# Patient Record
Sex: MALE | Race: WHITE | Employment: OTHER | URBAN - METROPOLITAN AREA
[De-identification: names, ages, dates, MRNs, and addresses within clinical notes are randomized per-mention and may not be internally consistent; named-entity substitution may affect disease eponyms.]

---

## 2017-01-04 ENCOUNTER — TELEPHONE (OUTPATIENT)
Dept: FAMILY MEDICINE CLINIC | Age: 62
End: 2017-01-04

## 2017-01-11 ENCOUNTER — OFFICE VISIT (OUTPATIENT)
Dept: FAMILY MEDICINE CLINIC | Age: 62
End: 2017-01-11

## 2017-01-11 VITALS
SYSTOLIC BLOOD PRESSURE: 134 MMHG | BODY MASS INDEX: 37.49 KG/M2 | DIASTOLIC BLOOD PRESSURE: 76 MMHG | WEIGHT: 282.9 LBS | HEART RATE: 84 BPM | HEIGHT: 73 IN | RESPIRATION RATE: 16 BRPM

## 2017-01-11 DIAGNOSIS — Z23 NEED FOR PNEUMOCOCCAL VACCINATION: ICD-10-CM

## 2017-01-11 DIAGNOSIS — E66.9 OBESITY (BMI 30-39.9): ICD-10-CM

## 2017-01-11 DIAGNOSIS — Z23 NEED FOR TDAP VACCINATION: ICD-10-CM

## 2017-01-11 DIAGNOSIS — Z11.4 SCREENING FOR HIV (HUMAN IMMUNODEFICIENCY VIRUS): ICD-10-CM

## 2017-01-11 DIAGNOSIS — E78.2 MIXED HYPERLIPIDEMIA: ICD-10-CM

## 2017-01-11 DIAGNOSIS — Z23 NEED FOR INFLUENZA VACCINATION: ICD-10-CM

## 2017-01-11 DIAGNOSIS — E11.9 CONTROLLED TYPE 2 DIABETES MELLITUS WITHOUT COMPLICATION, WITHOUT LONG-TERM CURRENT USE OF INSULIN (HCC): Primary | ICD-10-CM

## 2017-01-11 DIAGNOSIS — Z11.59 NEED FOR HEPATITIS C SCREENING TEST: ICD-10-CM

## 2017-01-11 DIAGNOSIS — I10 ESSENTIAL HYPERTENSION: ICD-10-CM

## 2017-01-11 DIAGNOSIS — Z86.73 HISTORY OF CVA (CEREBROVASCULAR ACCIDENT): ICD-10-CM

## 2017-01-11 PROCEDURE — 90471 IMMUNIZATION ADMIN: CPT | Performed by: NURSE PRACTITIONER

## 2017-01-11 PROCEDURE — 90715 TDAP VACCINE 7 YRS/> IM: CPT | Performed by: NURSE PRACTITIONER

## 2017-01-11 PROCEDURE — 90732 PPSV23 VACC 2 YRS+ SUBQ/IM: CPT | Performed by: NURSE PRACTITIONER

## 2017-01-11 PROCEDURE — 90688 IIV4 VACCINE SPLT 0.5 ML IM: CPT | Performed by: NURSE PRACTITIONER

## 2017-01-11 PROCEDURE — 99214 OFFICE O/P EST MOD 30 MIN: CPT | Performed by: NURSE PRACTITIONER

## 2017-01-11 PROCEDURE — 90472 IMMUNIZATION ADMIN EACH ADD: CPT | Performed by: NURSE PRACTITIONER

## 2017-01-11 RX ORDER — LOSARTAN POTASSIUM 50 MG/1
50 TABLET ORAL DAILY
Qty: 30 TABLET | Refills: 11 | Status: SHIPPED | OUTPATIENT
Start: 2017-01-11 | End: 2018-01-19 | Stop reason: SDUPTHER

## 2017-01-11 ASSESSMENT — PATIENT HEALTH QUESTIONNAIRE - PHQ9
SUM OF ALL RESPONSES TO PHQ9 QUESTIONS 1 & 2: 0
2. FEELING DOWN, DEPRESSED OR HOPELESS: 0
1. LITTLE INTEREST OR PLEASURE IN DOING THINGS: 0
SUM OF ALL RESPONSES TO PHQ QUESTIONS 1-9: 0

## 2017-01-12 ASSESSMENT — ENCOUNTER SYMPTOMS
NAUSEA: 0
SHORTNESS OF BREATH: 0
COUGH: 0
ABDOMINAL PAIN: 0

## 2017-01-16 ENCOUNTER — TELEPHONE (OUTPATIENT)
Dept: FAMILY MEDICINE CLINIC | Age: 62
End: 2017-01-16

## 2017-01-16 DIAGNOSIS — E78.2 MIXED HYPERLIPIDEMIA: Primary | ICD-10-CM

## 2017-01-16 DIAGNOSIS — E11.9 CONTROLLED TYPE 2 DIABETES MELLITUS WITHOUT COMPLICATION, WITHOUT LONG-TERM CURRENT USE OF INSULIN (HCC): ICD-10-CM

## 2017-01-16 RX ORDER — ATORVASTATIN CALCIUM 40 MG/1
40 TABLET, FILM COATED ORAL DAILY
Qty: 30 TABLET | Refills: 5 | Status: SHIPPED | OUTPATIENT
Start: 2017-01-16 | End: 2018-01-19 | Stop reason: SDUPTHER

## 2017-01-16 RX ORDER — PIOGLITAZONEHYDROCHLORIDE 45 MG/1
45 TABLET ORAL DAILY
Qty: 30 TABLET | Refills: 5 | Status: SHIPPED | OUTPATIENT
Start: 2017-01-16 | End: 2017-10-17

## 2017-07-05 ENCOUNTER — TELEPHONE (OUTPATIENT)
Dept: FAMILY MEDICINE CLINIC | Age: 62
End: 2017-07-05

## 2017-07-17 ENCOUNTER — OFFICE VISIT (OUTPATIENT)
Dept: FAMILY MEDICINE CLINIC | Age: 62
End: 2017-07-17
Payer: MEDICAID

## 2017-07-17 VITALS
RESPIRATION RATE: 14 BRPM | HEART RATE: 71 BPM | HEIGHT: 73 IN | SYSTOLIC BLOOD PRESSURE: 118 MMHG | OXYGEN SATURATION: 98 % | DIASTOLIC BLOOD PRESSURE: 76 MMHG | BODY MASS INDEX: 38.4 KG/M2 | WEIGHT: 289.7 LBS

## 2017-07-17 DIAGNOSIS — M77.12 LEFT TENNIS ELBOW: ICD-10-CM

## 2017-07-17 DIAGNOSIS — E78.2 MIXED HYPERLIPIDEMIA: ICD-10-CM

## 2017-07-17 DIAGNOSIS — I10 ESSENTIAL HYPERTENSION: ICD-10-CM

## 2017-07-17 DIAGNOSIS — E66.9 OBESITY (BMI 30-39.9): ICD-10-CM

## 2017-07-17 DIAGNOSIS — Z86.73 HISTORY OF CVA (CEREBROVASCULAR ACCIDENT): ICD-10-CM

## 2017-07-17 DIAGNOSIS — E11.9 CONTROLLED TYPE 2 DIABETES MELLITUS WITHOUT COMPLICATION, WITHOUT LONG-TERM CURRENT USE OF INSULIN (HCC): Primary | ICD-10-CM

## 2017-07-17 PROCEDURE — 99214 OFFICE O/P EST MOD 30 MIN: CPT | Performed by: NURSE PRACTITIONER

## 2017-07-17 ASSESSMENT — ENCOUNTER SYMPTOMS
SHORTNESS OF BREATH: 0
ABDOMINAL PAIN: 0
COUGH: 0
NAUSEA: 0

## 2017-09-24 DIAGNOSIS — E11.9 CONTROLLED TYPE 2 DIABETES MELLITUS WITHOUT COMPLICATION, WITHOUT LONG-TERM CURRENT USE OF INSULIN (HCC): ICD-10-CM

## 2017-10-10 ENCOUNTER — TELEPHONE (OUTPATIENT)
Dept: FAMILY MEDICINE CLINIC | Age: 62
End: 2017-10-10

## 2017-10-16 ENCOUNTER — HOSPITAL ENCOUNTER (OUTPATIENT)
Age: 62
Discharge: HOME | End: 2017-10-16
Payer: MEDICAID

## 2017-10-16 DIAGNOSIS — E11.9 CONTROLLED TYPE 2 DIABETES MELLITUS WITHOUT COMPLICATION, WITHOUT LONG-TERM CURRENT USE OF INSULIN (HCC): ICD-10-CM

## 2017-10-16 LAB
AVERAGE GLUCOSE: 192 MG/DL (ref 70–126)
CREATININE, URINE: 65.8 MG/DL
HBA1C MFR BLD: 8.4 % (ref 4.4–6.4)
MICROALBUMIN UR-MCNC: < 1.2 MG/DL
MICROALBUMIN/CREAT UR-RTO: 18 MG/G (ref 0–30)

## 2017-10-16 PROCEDURE — 36415 COLL VENOUS BLD VENIPUNCTURE: CPT

## 2017-10-16 PROCEDURE — 83036 HEMOGLOBIN GLYCOSYLATED A1C: CPT

## 2017-10-16 PROCEDURE — 82043 UR ALBUMIN QUANTITATIVE: CPT

## 2017-10-17 ENCOUNTER — OFFICE VISIT (OUTPATIENT)
Dept: FAMILY MEDICINE CLINIC | Age: 62
End: 2017-10-17
Payer: MEDICAID

## 2017-10-17 VITALS
OXYGEN SATURATION: 97 % | HEART RATE: 82 BPM | RESPIRATION RATE: 14 BRPM | BODY MASS INDEX: 36.98 KG/M2 | WEIGHT: 279 LBS | HEIGHT: 73 IN | SYSTOLIC BLOOD PRESSURE: 126 MMHG | DIASTOLIC BLOOD PRESSURE: 66 MMHG

## 2017-10-17 DIAGNOSIS — E66.9 OBESITY (BMI 30-39.9): ICD-10-CM

## 2017-10-17 DIAGNOSIS — E78.2 MIXED HYPERLIPIDEMIA: ICD-10-CM

## 2017-10-17 DIAGNOSIS — Z12.5 SCREENING PSA (PROSTATE SPECIFIC ANTIGEN): ICD-10-CM

## 2017-10-17 DIAGNOSIS — Z86.73 HISTORY OF CVA (CEREBROVASCULAR ACCIDENT): ICD-10-CM

## 2017-10-17 DIAGNOSIS — I10 ESSENTIAL HYPERTENSION: ICD-10-CM

## 2017-10-17 DIAGNOSIS — E11.9 CONTROLLED TYPE 2 DIABETES MELLITUS WITHOUT COMPLICATION, WITHOUT LONG-TERM CURRENT USE OF INSULIN (HCC): Primary | ICD-10-CM

## 2017-10-17 PROCEDURE — 99214 OFFICE O/P EST MOD 30 MIN: CPT | Performed by: NURSE PRACTITIONER

## 2017-10-17 RX ORDER — PIOGLITAZONEHYDROCHLORIDE 45 MG/1
45 TABLET ORAL DAILY
Qty: 30 TABLET | Refills: 3 | Status: SHIPPED | OUTPATIENT
Start: 2017-10-17 | End: 2018-01-19 | Stop reason: SDUPTHER

## 2017-10-17 ASSESSMENT — ENCOUNTER SYMPTOMS
NAUSEA: 0
COUGH: 0
SHORTNESS OF BREATH: 0
ABDOMINAL PAIN: 0

## 2017-10-17 NOTE — PROGRESS NOTES
Visit Information       Chronic Disease Visit Information    BP Readings from Last 3 Encounters:   10/17/17 126/66   07/17/17 118/76   01/11/17 134/76          Hemoglobin A1C (%)   Date Value   10/16/2017 8.4 (H)   07/13/2017 8.3 (H)   01/14/2017 8.9 (H)     MICROALBUMIN, RANDOM URINE (mg/dL)   Date Value   10/16/2017 < 1.20     LDL Calculated (mg/dL)   Date Value   07/13/2017 158     HDL (mg/dL)   Date Value   07/13/2017 44     BUN (mg/dl)   Date Value   01/14/2017 13     CREATININE (mg/dl)   Date Value   01/14/2017 0.9     Glucose   Date Value   01/14/2017 179 mg/dl (H)   07/02/2015 197 mg/dL (H)            Have you changed or started any medications since your last visit including any over-the-counter medicines, vitamins, or herbal medicines? no   Are you having any side effects from any of your medications? -  no  Have you stopped taking any of your medications? Is so, why? -  no    Have you seen any other physician or provider since your last visit? No  Have you had any other diagnostic tests since your last visit? Yes - Records Obtained  Have you been seen in the emergency room and/or had an admission to a hospital since we last saw you? No  Have you had your annual diabetic retinal (eye) exam? No  Have you had your routine dental cleaning in the past 6 months? no    Have you activated your [employer] account? If not, what are your barriers?  No: refused     Patient Care Team:  Aissatou Bess NP as PCP - General (Certified Nurse Practitioner)         Medical History Review  Past Medical, Family, and Social History reviewed and does contribute to the patient presenting condition    Health Maintenance   Topic Date Due    Diabetic retinal exam  04/26/1966    HIV screen  04/26/1971    Zostavax vaccine  04/26/2016    Flu vaccine (1) 09/01/2017    Lipid screen  07/13/2018    Diabetic foot exam  07/17/2018    Diabetic hemoglobin A1C test  10/16/2018    Diabetic microalbuminuria test  10/16/2018    Colon cancer screen colonoscopy  01/09/2027    DTaP/Tdap/Td vaccine (2 - Td) 01/11/2027    Pneumococcal med risk  Completed    Hepatitis C screen  Completed         Health Maintenance   Topic Date Due    Diabetic retinal exam  04/26/1966    HIV screen  04/26/1971    Zostavax vaccine  04/26/2016    Flu vaccine (1) 09/01/2017    Lipid screen  07/13/2018    Diabetic foot exam  07/17/2018    Diabetic hemoglobin A1C test  10/16/2018    Diabetic microalbuminuria test  10/16/2018    Colon cancer screen colonoscopy  01/09/2027    DTaP/Tdap/Td vaccine (2 - Td) 01/11/2027    Pneumococcal med risk  Completed    Hepatitis C screen  Completed

## 2017-12-21 ENCOUNTER — TELEPHONE (OUTPATIENT)
Dept: FAMILY MEDICINE CLINIC | Age: 62
End: 2017-12-21

## 2017-12-21 NOTE — TELEPHONE ENCOUNTER
Patient is calling in because off/on for a couple of months now he has had episodes where he wakes up from sleeping, and his left arm is colder than the right and he has tingling in some of his fingers. He said it happened yesterday and today, but off/on for a couple of months. He wanted to know what Manpreet Case would recommend he do. He declined to schedule an appointment unless Manpreet Case wanted to see him. Please advise.

## 2017-12-26 ENCOUNTER — OFFICE VISIT (OUTPATIENT)
Dept: FAMILY MEDICINE CLINIC | Age: 62
End: 2017-12-26
Payer: MEDICAID

## 2017-12-26 VITALS
HEART RATE: 76 BPM | HEIGHT: 73 IN | BODY MASS INDEX: 37.34 KG/M2 | SYSTOLIC BLOOD PRESSURE: 158 MMHG | DIASTOLIC BLOOD PRESSURE: 88 MMHG | WEIGHT: 281.7 LBS | RESPIRATION RATE: 16 BRPM

## 2017-12-26 DIAGNOSIS — E11.9 CONTROLLED TYPE 2 DIABETES MELLITUS WITHOUT COMPLICATION, WITHOUT LONG-TERM CURRENT USE OF INSULIN (HCC): ICD-10-CM

## 2017-12-26 DIAGNOSIS — I10 ESSENTIAL HYPERTENSION: ICD-10-CM

## 2017-12-26 DIAGNOSIS — L92.9 GRANULOMA, SKIN: ICD-10-CM

## 2017-12-26 DIAGNOSIS — R20.9 COLD HAND WITHOUT PERIPHERAL VASCULAR DISEASE: Primary | ICD-10-CM

## 2017-12-26 DIAGNOSIS — Z86.73 HISTORY OF CVA (CEREBROVASCULAR ACCIDENT): ICD-10-CM

## 2017-12-26 DIAGNOSIS — E78.2 MIXED HYPERLIPIDEMIA: ICD-10-CM

## 2017-12-26 PROCEDURE — G8417 CALC BMI ABV UP PARAM F/U: HCPCS | Performed by: NURSE PRACTITIONER

## 2017-12-26 PROCEDURE — G8484 FLU IMMUNIZE NO ADMIN: HCPCS | Performed by: NURSE PRACTITIONER

## 2017-12-26 PROCEDURE — 3045F PR MOST RECENT HEMOGLOBIN A1C LEVEL 7.0-9.0%: CPT | Performed by: NURSE PRACTITIONER

## 2017-12-26 PROCEDURE — 3017F COLORECTAL CA SCREEN DOC REV: CPT | Performed by: NURSE PRACTITIONER

## 2017-12-26 PROCEDURE — 1036F TOBACCO NON-USER: CPT | Performed by: NURSE PRACTITIONER

## 2017-12-26 PROCEDURE — G8427 DOCREV CUR MEDS BY ELIG CLIN: HCPCS | Performed by: NURSE PRACTITIONER

## 2017-12-26 PROCEDURE — 99213 OFFICE O/P EST LOW 20 MIN: CPT | Performed by: NURSE PRACTITIONER

## 2017-12-26 ASSESSMENT — ENCOUNTER SYMPTOMS
NAUSEA: 0
ABDOMINAL PAIN: 0
SHORTNESS OF BREATH: 0
COUGH: 0

## 2017-12-26 NOTE — PROGRESS NOTES
Visit Information    Have you changed or started any medications since your last visit including any over-the-counter medicines, vitamins, or herbal medicines? no   Are you having any side effects from any of your medications? -  no  Have you stopped taking any of your medications? Is so, why? -  no    Have you seen any other physician or provider since your last visit? No  Have you had any other diagnostic tests since your last visit? No  Have you been seen in the emergency room and/or had an admission to a hospital since we last saw you? No  Have you had your routine dental cleaning in the past 6 months? no    Have you activated your [employer] account? If not, what are your barriers? No: pt choice     Patient Care Team:  Lorette Homans, NP as PCP - General (Certified Nurse Practitioner)    Medical History Review  Past Medical, Family, and Social History reviewed and does not contribute to the patient presenting condition    Health Maintenance   Topic Date Due    Diabetic retinal exam  04/26/1966    HIV screen  04/26/1971    Zostavax vaccine  04/26/2016    Flu vaccine (1) 09/01/2017    Lipid screen  07/13/2018    Diabetic foot exam  07/17/2018    Diabetic hemoglobin A1C test  10/16/2018    Diabetic microalbuminuria test  10/16/2018    Colon cancer screen colonoscopy  01/09/2027    DTaP/Tdap/Td vaccine (2 - Td) 01/11/2027    Pneumococcal med risk  Completed    Hepatitis C screen  Completed       US doppler arterial left arm scheduled at [facility] Wednesday 12/27/17 at 10am. Pt is to arrive by 9:30am to the 2nd floor [address].

## 2017-12-26 NOTE — PROGRESS NOTES
Subjective:      Patient ID: Jai Ramirez is a 65 y.o. male. HPI: Acute for cold hand    Chief Complaint   Patient presents with    Tingling     left pinky and ring finger    Cold Extremity     left arm    Lesion(s)     left middle finger       Onset of 2-3 months of off and on left cold extremity, tingling and numbness in 4th and 5th digit when he wakes up in morning. Denies daily event. Denies arm weakness or pain in arm with activity. Symptoms resolve through the day. Right side sleeper. Hx of CVA. No smoking hx. Lesion on left middle finger. Onset of 1 week. No pain. Hard. No redness. Denies trauma. FROM of finger. Patient Active Problem List   Diagnosis    Controlled type 2 diabetes mellitus without complication, without long-term current use of insulin ([facility] [address]    Essential hypertension    History of CVA (cerebrovascular accident)    Mixed hyperlipidemia    Obesity (BMI 30-39. 9)       BP elevated. Did not take his BP medications. BP Readings from Last 3 Encounters:   12/26/17 (!) 158/88   10/17/17 126/66   07/17/17 118/76         Review of Systems   Constitutional: Negative for chills and fever. HENT: Negative. Respiratory: Negative for cough and shortness of breath. Cardiovascular: Negative for chest pain. Gastrointestinal: Negative for abdominal pain and nausea. Skin: Negative for rash. Neurological: Positive for numbness (tingling). Negative for dizziness, weakness, light-headedness and headaches. Psychiatric/Behavioral: Negative. Objective:   Physical Exam   Constitutional: He is oriented to person, place, and time. Vital signs are normal. He appears well-developed and well-nourished. He is active. He does not have a sickly appearance. No distress.    HENT:   Right Ear: Tympanic membrane normal.   Left Ear: Tympanic membrane normal.   Nose: Nose normal.   Mouth/Throat: Oropharynx is clear and moist and mucous membranes are normal.

## 2017-12-29 ENCOUNTER — HOSPITAL ENCOUNTER (OUTPATIENT)
Dept: INTERVENTIONAL RADIOLOGY/VASCULAR | Age: 62
Discharge: HOME | End: 2017-12-29
Payer: MEDICAID

## 2017-12-29 DIAGNOSIS — R20.9 COLD HAND WITHOUT PERIPHERAL VASCULAR DISEASE: ICD-10-CM

## 2017-12-29 PROCEDURE — 93931 UPPER EXTREMITY STUDY: CPT

## 2018-01-09 ENCOUNTER — TELEPHONE (OUTPATIENT)
Dept: FAMILY MEDICINE CLINIC | Age: 63
End: 2018-01-09

## 2018-01-09 NOTE — TELEPHONE ENCOUNTER
L/M for pt to call office. Called patient for pre visit planning. Pt has appt on 1/17/18 at 3:30pm.  Please remind pt of appt date and time and check to see if they had their lab work done.

## 2018-01-18 ENCOUNTER — HOSPITAL ENCOUNTER (OUTPATIENT)
Age: 63
Discharge: HOME | End: 2018-01-18
Payer: MEDICAID

## 2018-01-18 DIAGNOSIS — Z12.5 SCREENING PSA (PROSTATE SPECIFIC ANTIGEN): ICD-10-CM

## 2018-01-18 DIAGNOSIS — E11.9 CONTROLLED TYPE 2 DIABETES MELLITUS WITHOUT COMPLICATION, WITHOUT LONG-TERM CURRENT USE OF INSULIN (HCC): ICD-10-CM

## 2018-01-18 DIAGNOSIS — E78.2 MIXED HYPERLIPIDEMIA: ICD-10-CM

## 2018-01-18 LAB
ALBUMIN SERPL-MCNC: 4.1 G/DL (ref 3.5–5.1)
ALP BLD-CCNC: 92 U/L (ref 38–126)
ALT SERPL-CCNC: 16 U/L (ref 11–66)
ANION GAP SERPL CALCULATED.3IONS-SCNC: 12 MEQ/L (ref 8–16)
AST SERPL-CCNC: 17 U/L (ref 5–40)
AVERAGE GLUCOSE: 174 MG/DL (ref 70–126)
BILIRUB SERPL-MCNC: 0.4 MG/DL (ref 0.3–1.2)
BUN BLDV-MCNC: 18 MG/DL (ref 7–22)
CALCIUM SERPL-MCNC: 9.3 MG/DL (ref 8.5–10.5)
CHLORIDE BLD-SCNC: 99 MEQ/L (ref 98–111)
CHOLESTEROL, TOTAL: 132 MG/DL (ref 100–199)
CO2: 30 MEQ/L (ref 23–33)
CREAT SERPL-MCNC: 1 MG/DL (ref 0.4–1.2)
GFR SERPL CREATININE-BSD FRML MDRD: 76 ML/MIN/1.73M2
GLUCOSE BLD-MCNC: 133 MG/DL (ref 70–108)
HBA1C MFR BLD: 7.8 % (ref 4.4–6.4)
HDLC SERPL-MCNC: 57 MG/DL
LDL CHOLESTEROL CALCULATED: 62 MG/DL
POTASSIUM SERPL-SCNC: 4.5 MEQ/L (ref 3.5–5.2)
PROSTATE SPECIFIC ANTIGEN: 0.64 NG/ML (ref 0–1)
SODIUM BLD-SCNC: 141 MEQ/L (ref 135–145)
TOTAL PROTEIN: 7.2 G/DL (ref 6.1–8)
TRIGL SERPL-MCNC: 65 MG/DL (ref 0–199)

## 2018-01-18 PROCEDURE — 36415 COLL VENOUS BLD VENIPUNCTURE: CPT

## 2018-01-18 PROCEDURE — G0103 PSA SCREENING: HCPCS

## 2018-01-18 PROCEDURE — 83036 HEMOGLOBIN GLYCOSYLATED A1C: CPT

## 2018-01-18 PROCEDURE — 80053 COMPREHEN METABOLIC PANEL: CPT

## 2018-01-18 PROCEDURE — 80061 LIPID PANEL: CPT

## 2018-01-19 ENCOUNTER — TELEPHONE (OUTPATIENT)
Dept: FAMILY MEDICINE CLINIC | Age: 63
End: 2018-01-19

## 2018-01-19 ENCOUNTER — OFFICE VISIT (OUTPATIENT)
Dept: FAMILY MEDICINE CLINIC | Age: 63
End: 2018-01-19
Payer: MEDICAID

## 2018-01-19 VITALS
DIASTOLIC BLOOD PRESSURE: 74 MMHG | WEIGHT: 285.5 LBS | HEIGHT: 73 IN | SYSTOLIC BLOOD PRESSURE: 136 MMHG | OXYGEN SATURATION: 99 % | HEART RATE: 78 BPM | BODY MASS INDEX: 37.84 KG/M2 | RESPIRATION RATE: 14 BRPM

## 2018-01-19 DIAGNOSIS — L92.9 GRANULOMA, SKIN: Primary | ICD-10-CM

## 2018-01-19 DIAGNOSIS — I10 ESSENTIAL HYPERTENSION: ICD-10-CM

## 2018-01-19 DIAGNOSIS — E11.9 CONTROLLED TYPE 2 DIABETES MELLITUS WITHOUT COMPLICATION, WITHOUT LONG-TERM CURRENT USE OF INSULIN (HCC): ICD-10-CM

## 2018-01-19 DIAGNOSIS — E78.2 MIXED HYPERLIPIDEMIA: ICD-10-CM

## 2018-01-19 PROCEDURE — G8427 DOCREV CUR MEDS BY ELIG CLIN: HCPCS | Performed by: NURSE PRACTITIONER

## 2018-01-19 PROCEDURE — G8484 FLU IMMUNIZE NO ADMIN: HCPCS | Performed by: NURSE PRACTITIONER

## 2018-01-19 PROCEDURE — 99214 OFFICE O/P EST MOD 30 MIN: CPT | Performed by: NURSE PRACTITIONER

## 2018-01-19 PROCEDURE — G8417 CALC BMI ABV UP PARAM F/U: HCPCS | Performed by: NURSE PRACTITIONER

## 2018-01-19 PROCEDURE — 3045F PR MOST RECENT HEMOGLOBIN A1C LEVEL 7.0-9.0%: CPT | Performed by: NURSE PRACTITIONER

## 2018-01-19 PROCEDURE — 3017F COLORECTAL CA SCREEN DOC REV: CPT | Performed by: NURSE PRACTITIONER

## 2018-01-19 PROCEDURE — 1036F TOBACCO NON-USER: CPT | Performed by: NURSE PRACTITIONER

## 2018-01-19 RX ORDER — ATORVASTATIN CALCIUM 40 MG/1
40 TABLET, FILM COATED ORAL DAILY
Qty: 30 TABLET | Refills: 11 | Status: SHIPPED | OUTPATIENT
Start: 2018-01-19 | End: 2019-04-24 | Stop reason: SDUPTHER

## 2018-01-19 RX ORDER — LOSARTAN POTASSIUM 50 MG/1
50 TABLET ORAL DAILY
Qty: 30 TABLET | Refills: 11 | Status: SHIPPED | OUTPATIENT
Start: 2018-01-19 | End: 2019-02-13 | Stop reason: SDUPTHER

## 2018-01-19 RX ORDER — PIOGLITAZONEHYDROCHLORIDE 45 MG/1
45 TABLET ORAL DAILY
Qty: 30 TABLET | Refills: 11 | Status: SHIPPED | OUTPATIENT
Start: 2018-01-19 | End: 2019-02-03 | Stop reason: SDUPTHER

## 2018-01-19 ASSESSMENT — ENCOUNTER SYMPTOMS
NAUSEA: 0
ABDOMINAL PAIN: 0
SHORTNESS OF BREATH: 0
COUGH: 0

## 2018-01-19 ASSESSMENT — PATIENT HEALTH QUESTIONNAIRE - PHQ9
SUM OF ALL RESPONSES TO PHQ9 QUESTIONS 1 & 2: 0
1. LITTLE INTEREST OR PLEASURE IN DOING THINGS: 0
2. FEELING DOWN, DEPRESSED OR HOPELESS: 0
SUM OF ALL RESPONSES TO PHQ QUESTIONS 1-9: 0

## 2018-01-19 NOTE — PROGRESS NOTES
Subjective:      Patient ID: Ruben Mcgowan is a 65 y.o. male. HPI  : 3 month Follow Up    Chief Complaint   Patient presents with    3 Month Follow-Up     lab results  left hand middle finger granuloma       Lesion on left middle finger. Onset of 4 week. No pain. Hard. No redness. Denies trauma. FROM of finger. Patient Active Problem List   Diagnosis    Controlled type 2 diabetes mellitus without complication, without long-term current use of insulin ([address]    Essential hypertension    History of CVA (cerebrovascular accident)    Mixed hyperlipidemia    Obesity (BMI 30-39. 9)       Overall doing well. Denies CP, SOB or chest tightness. No smoking hx. COLONOSCOPY - 2016    BP Readings from Last 3 Encounters:   01/19/18 136/74   12/26/17 (!) 158/88   10/17/17 126/66       BP wnl. Losartan 50 mg. Wt Readings from Last 3 Encounters:   01/19/18 285 lb 8 oz (129.5 kg)   12/26/17 281 lb 11.2 oz (127.8 kg)   10/17/17 279 lb (126.6 kg)       He is on Metformin 1000 mg, Jardiance 25 mg and Actos 45 mg Tolerating well. Diet poor. A1C shows improvement but not controlled. Lab Results   Component Value Date    LABA1C 7.8 (H) 01/18/2018    LABA1C 8.4 (H) 10/16/2017    LABA1C 8.3 (H) 07/13/2017     Lab Results   Component Value Date     07/02/2015       Lipitor 40 mg. Tolerating well.      No components found for: CHLPL  Lab Results   Component Value Date    TRIG 65 01/18/2018    TRIG 102 07/13/2017    TRIG 91 01/14/2017     Lab Results   Component Value Date    HDL 57 01/18/2018    HDL 44 07/13/2017    HDL 50 01/14/2017     Lab Results   Component Value Date    LDLCALC 62 01/18/2018    LDLCALC 158 07/13/2017    LDLCALC 150 01/14/2017     No results found for: LABVLDL      Chemistry        Component Value Date/Time     01/18/2018 1354    K 4.5 01/18/2018 1354    CL 99 01/18/2018 1354    CO2 30 01/18/2018 1354    BUN 18 01/18/2018 1354    CREATININE 1.0 01/18/2018 1354

## 2018-01-22 DIAGNOSIS — E11.9 CONTROLLED TYPE 2 DIABETES MELLITUS WITHOUT COMPLICATION, WITHOUT LONG-TERM CURRENT USE OF INSULIN (HCC): Primary | ICD-10-CM

## 2018-01-22 RX ORDER — ALOGLIPTIN 25 MG/1
25 TABLET, FILM COATED ORAL DAILY
Qty: 30 TABLET | Refills: 5 | Status: SHIPPED | OUTPATIENT
Start: 2018-01-22 | End: 2018-04-19 | Stop reason: SDUPTHER

## 2018-01-22 NOTE — TELEPHONE ENCOUNTER
Patient called back and gave phone # of Ortho in [place] with Jackeline Castillo. #[phone]. I called and got patient scheduled with Dr COHEN [facility] for 1/29 @2:30. Patient needs to arrive at 2:15pm. [address]. They do need referral faxed to [phone]. Please advice.

## 2018-01-23 DIAGNOSIS — E78.2 MIXED HYPERLIPIDEMIA: ICD-10-CM

## 2018-01-23 RX ORDER — ATORVASTATIN CALCIUM 40 MG/1
TABLET, FILM COATED ORAL
Qty: 30 TABLET | Refills: 11 | Status: SHIPPED | OUTPATIENT
Start: 2018-01-23 | End: 2018-04-19

## 2018-04-19 ENCOUNTER — OFFICE VISIT (OUTPATIENT)
Dept: FAMILY MEDICINE CLINIC | Age: 63
End: 2018-04-19
Payer: MEDICAID

## 2018-04-19 VITALS
HEIGHT: 73 IN | DIASTOLIC BLOOD PRESSURE: 60 MMHG | HEART RATE: 74 BPM | OXYGEN SATURATION: 97 % | SYSTOLIC BLOOD PRESSURE: 112 MMHG | RESPIRATION RATE: 14 BRPM | WEIGHT: 284 LBS | BODY MASS INDEX: 37.64 KG/M2

## 2018-04-19 DIAGNOSIS — E11.9 CONTROLLED TYPE 2 DIABETES MELLITUS WITHOUT COMPLICATION, WITHOUT LONG-TERM CURRENT USE OF INSULIN (HCC): Primary | ICD-10-CM

## 2018-04-19 DIAGNOSIS — I10 ESSENTIAL HYPERTENSION: ICD-10-CM

## 2018-04-19 DIAGNOSIS — E66.9 OBESITY (BMI 30-39.9): ICD-10-CM

## 2018-04-19 DIAGNOSIS — Z86.73 HISTORY OF CVA (CEREBROVASCULAR ACCIDENT): ICD-10-CM

## 2018-04-19 DIAGNOSIS — E78.2 MIXED HYPERLIPIDEMIA: ICD-10-CM

## 2018-04-19 LAB — HBA1C MFR BLD: 6.6 %

## 2018-04-19 PROCEDURE — G8427 DOCREV CUR MEDS BY ELIG CLIN: HCPCS | Performed by: NURSE PRACTITIONER

## 2018-04-19 PROCEDURE — 1036F TOBACCO NON-USER: CPT | Performed by: NURSE PRACTITIONER

## 2018-04-19 PROCEDURE — 3044F HG A1C LEVEL LT 7.0%: CPT | Performed by: NURSE PRACTITIONER

## 2018-04-19 PROCEDURE — 83036 HEMOGLOBIN GLYCOSYLATED A1C: CPT | Performed by: NURSE PRACTITIONER

## 2018-04-19 PROCEDURE — 3017F COLORECTAL CA SCREEN DOC REV: CPT | Performed by: NURSE PRACTITIONER

## 2018-04-19 PROCEDURE — 99214 OFFICE O/P EST MOD 30 MIN: CPT | Performed by: NURSE PRACTITIONER

## 2018-04-19 PROCEDURE — 2022F DILAT RTA XM EVC RTNOPTHY: CPT | Performed by: NURSE PRACTITIONER

## 2018-04-19 PROCEDURE — G8417 CALC BMI ABV UP PARAM F/U: HCPCS | Performed by: NURSE PRACTITIONER

## 2018-04-19 RX ORDER — ALOGLIPTIN 25 MG/1
25 TABLET, FILM COATED ORAL DAILY
Qty: 30 TABLET | Refills: 11 | Status: SHIPPED | OUTPATIENT
Start: 2018-04-19 | End: 2019-04-24 | Stop reason: SDUPTHER

## 2018-04-19 ASSESSMENT — ENCOUNTER SYMPTOMS
SHORTNESS OF BREATH: 0
COUGH: 0
ABDOMINAL PAIN: 0
NAUSEA: 0

## 2018-06-20 LAB
INR BLD: 1.03
PROTIME: 11.8 SECONDS

## 2018-06-21 ENCOUNTER — TELEPHONE (OUTPATIENT)
Dept: FAMILY MEDICINE CLINIC | Age: 63
End: 2018-06-21

## 2018-07-03 ENCOUNTER — OFFICE VISIT (OUTPATIENT)
Dept: FAMILY MEDICINE CLINIC | Age: 63
End: 2018-07-03
Payer: MEDICAID

## 2018-07-03 VITALS
TEMPERATURE: 98 F | BODY MASS INDEX: 37.56 KG/M2 | WEIGHT: 283.4 LBS | DIASTOLIC BLOOD PRESSURE: 66 MMHG | SYSTOLIC BLOOD PRESSURE: 122 MMHG | HEART RATE: 78 BPM | HEIGHT: 73 IN | RESPIRATION RATE: 20 BRPM

## 2018-07-03 DIAGNOSIS — E78.2 MIXED HYPERLIPIDEMIA: ICD-10-CM

## 2018-07-03 DIAGNOSIS — E66.9 OBESITY (BMI 30-39.9): ICD-10-CM

## 2018-07-03 DIAGNOSIS — E11.9 CONTROLLED TYPE 2 DIABETES MELLITUS WITHOUT COMPLICATION, WITHOUT LONG-TERM CURRENT USE OF INSULIN (HCC): ICD-10-CM

## 2018-07-03 DIAGNOSIS — I10 ESSENTIAL HYPERTENSION: Primary | ICD-10-CM

## 2018-07-03 PROCEDURE — 2022F DILAT RTA XM EVC RTNOPTHY: CPT | Performed by: NURSE PRACTITIONER

## 2018-07-03 PROCEDURE — G8417 CALC BMI ABV UP PARAM F/U: HCPCS | Performed by: NURSE PRACTITIONER

## 2018-07-03 PROCEDURE — 1036F TOBACCO NON-USER: CPT | Performed by: NURSE PRACTITIONER

## 2018-07-03 PROCEDURE — 3017F COLORECTAL CA SCREEN DOC REV: CPT | Performed by: NURSE PRACTITIONER

## 2018-07-03 PROCEDURE — 3044F HG A1C LEVEL LT 7.0%: CPT | Performed by: NURSE PRACTITIONER

## 2018-07-03 PROCEDURE — 99214 OFFICE O/P EST MOD 30 MIN: CPT | Performed by: NURSE PRACTITIONER

## 2018-07-03 PROCEDURE — G8427 DOCREV CUR MEDS BY ELIG CLIN: HCPCS | Performed by: NURSE PRACTITIONER

## 2018-07-03 ASSESSMENT — ENCOUNTER SYMPTOMS
COUGH: 0
NAUSEA: 0
SHORTNESS OF BREATH: 0
ABDOMINAL PAIN: 0

## 2018-07-03 NOTE — PROGRESS NOTES
Subjective:      Patient ID: Kirby Hale is a 59 y.o. male. HPI: [facility] Follow Up    Chief Complaint   Patient presents with    Follow-Up from Hospital     Patient was discharged from [facility] 6/21/2018 for possible tia. Was seen in [facility] on 6/21/18 for uneasy feeling - felt SOB, seen spots in his eyes and felt weak. Had some tingling in left hand. Symptoms resolved by time seen in ER. Was admitted overnight. No issues since. CT as below    CT Head:  FINDINGS:     Normal soft tissue structures.  Normal calvarium.          There is mild cerebral atrophy with widening of the extra-axial spaces and     ventricular dilatation.  There are areas of decreased attenuation within     the white matter tracts of the supratentorial brain, consistent with     microvascular disease changes.  There are bilateral lacunar infarcts of     the basal ganglia and thalami.  Normal brainstem.  Normal cerebellum.          There is no intracranial hemorrhage.  There are no findings of an acute     ischemic infarction.          Normal visualized paranasal sinuses.     ___________________________________          IMPRESSION:     No acute abnormality. Mild atrophy and White matter disease.         Episode in 2015 of one-sided leg weakness. Had MRI Brain, MRA Brain/NECK, ECHO and EEG that were NEG. No carotid stenosis. ECHO show stage 1 diastolic dysfunction with preserved EF. Works physically demanding job. Denies SOB or chest tightness. Denies with activity. Denies lightheadedness. No decrease endurance. Patient Active Problem List   Diagnosis    Controlled type 2 diabetes mellitus without complication, without long-term current use of insulin ([facility] [address]    Essential hypertension    History of CVA (cerebrovascular accident)    Mixed hyperlipidemia    Obesity (BMI 30-39. 9)       BP wnl.   Losartan 50 mg.     BP Readings from Last 3 Encounters:   07/03/18 122/66   04/19/18 112/60   01/19/18 136/74     He is on Immunization History   Administered Date(s) Administered    Influenza, Quadv, 3 Years and older, IM 01/11/2017    Pneumococcal Polysaccharide ([id number]) 01/11/2017    Tdap (Boostrix, Adacel) 01/11/2017           Review of Systems   Constitutional: Negative for chills and fever. HENT: Negative. Respiratory: Negative for cough and shortness of breath. Cardiovascular: Negative for chest pain. Gastrointestinal: Negative for abdominal pain and nausea. Skin: Negative for rash. Neurological: Negative for dizziness, light-headedness and headaches. Psychiatric/Behavioral: Negative. Objective:   Physical Exam   Constitutional: He is oriented to person, place, and time. Vital signs are normal. He appears well-developed and well-nourished. He is active. He does not have a sickly appearance. No distress. HENT:   Right Ear: Tympanic membrane normal.   Left Ear: Tympanic membrane normal.   Nose: Nose normal.   Mouth/Throat: Oropharynx is clear and moist and mucous membranes are normal.   Cardiovascular: Normal rate, regular rhythm, S1 normal, S2 normal, normal heart sounds and normal pulses. Exam reveals no S3. No murmur heard. Pulmonary/Chest: Effort normal and breath sounds normal. He has no decreased breath sounds. He has no wheezes. He has no rhonchi. Abdominal: Soft. Bowel sounds are normal. There is no tenderness. Neurological: He is alert and oriented to person, place, and time. Psychiatric: He has a normal mood and affect. His behavior is normal.       Assessment:       Diagnosis Orders   1. Essential hypertension     2. Controlled type 2 diabetes mellitus without complication, without long-term current use of insulin ([address]     3. Mixed hyperlipidemia     4. Obesity (BMI 30-39. 9)             Plan:      [facility] Correspondence reviewed  Imaging from 2015 episode of suspected TIA reviewed that was NEG  Unknown etiology of previous episode - feel TIA is not appropriate  No red flags

## 2018-10-09 ENCOUNTER — TELEPHONE (OUTPATIENT)
Dept: FAMILY MEDICINE CLINIC | Age: 63
End: 2018-10-09

## 2018-10-09 NOTE — TELEPHONE ENCOUNTER
Your request has been approved   Your PA request has been approved. Additional information will be provided in the approval communication.  (Message [phone])

## 2018-10-15 ENCOUNTER — TELEPHONE (OUTPATIENT)
Dept: FAMILY MEDICINE CLINIC | Age: 63
End: 2018-10-15

## 2018-10-18 ENCOUNTER — OFFICE VISIT (OUTPATIENT)
Dept: FAMILY MEDICINE CLINIC | Age: 63
End: 2018-10-18
Payer: MEDICAID

## 2018-10-18 VITALS
SYSTOLIC BLOOD PRESSURE: 128 MMHG | WEIGHT: 285 LBS | HEART RATE: 76 BPM | RESPIRATION RATE: 16 BRPM | HEIGHT: 73 IN | BODY MASS INDEX: 37.77 KG/M2 | DIASTOLIC BLOOD PRESSURE: 76 MMHG

## 2018-10-18 DIAGNOSIS — E66.9 OBESITY (BMI 30-39.9): ICD-10-CM

## 2018-10-18 DIAGNOSIS — I10 ESSENTIAL HYPERTENSION: ICD-10-CM

## 2018-10-18 DIAGNOSIS — Z12.5 SCREENING PSA (PROSTATE SPECIFIC ANTIGEN): ICD-10-CM

## 2018-10-18 DIAGNOSIS — E78.2 MIXED HYPERLIPIDEMIA: ICD-10-CM

## 2018-10-18 DIAGNOSIS — E11.9 CONTROLLED TYPE 2 DIABETES MELLITUS WITHOUT COMPLICATION, WITHOUT LONG-TERM CURRENT USE OF INSULIN (HCC): Primary | ICD-10-CM

## 2018-10-18 DIAGNOSIS — Z23 NEED FOR INFLUENZA VACCINATION: ICD-10-CM

## 2018-10-18 LAB — HBA1C MFR BLD: 6.1 %

## 2018-10-18 PROCEDURE — G8482 FLU IMMUNIZE ORDER/ADMIN: HCPCS | Performed by: NURSE PRACTITIONER

## 2018-10-18 PROCEDURE — 90471 IMMUNIZATION ADMIN: CPT | Performed by: NURSE PRACTITIONER

## 2018-10-18 PROCEDURE — 1036F TOBACCO NON-USER: CPT | Performed by: NURSE PRACTITIONER

## 2018-10-18 PROCEDURE — 90688 IIV4 VACCINE SPLT 0.5 ML IM: CPT | Performed by: NURSE PRACTITIONER

## 2018-10-18 PROCEDURE — G8427 DOCREV CUR MEDS BY ELIG CLIN: HCPCS | Performed by: NURSE PRACTITIONER

## 2018-10-18 PROCEDURE — 3017F COLORECTAL CA SCREEN DOC REV: CPT | Performed by: NURSE PRACTITIONER

## 2018-10-18 PROCEDURE — 3044F HG A1C LEVEL LT 7.0%: CPT | Performed by: NURSE PRACTITIONER

## 2018-10-18 PROCEDURE — 2022F DILAT RTA XM EVC RTNOPTHY: CPT | Performed by: NURSE PRACTITIONER

## 2018-10-18 PROCEDURE — 99214 OFFICE O/P EST MOD 30 MIN: CPT | Performed by: NURSE PRACTITIONER

## 2018-10-18 PROCEDURE — G8417 CALC BMI ABV UP PARAM F/U: HCPCS | Performed by: NURSE PRACTITIONER

## 2018-10-18 PROCEDURE — 83036 HEMOGLOBIN GLYCOSYLATED A1C: CPT | Performed by: NURSE PRACTITIONER

## 2018-10-18 ASSESSMENT — ENCOUNTER SYMPTOMS
COUGH: 0
SHORTNESS OF BREATH: 0
ABDOMINAL PAIN: 0
NAUSEA: 0

## 2019-02-03 DIAGNOSIS — E11.9 CONTROLLED TYPE 2 DIABETES MELLITUS WITHOUT COMPLICATION, WITHOUT LONG-TERM CURRENT USE OF INSULIN (HCC): ICD-10-CM

## 2019-02-04 RX ORDER — PIOGLITAZONEHYDROCHLORIDE 45 MG/1
TABLET ORAL
Qty: 30 TABLET | Refills: 11 | Status: SHIPPED | OUTPATIENT
Start: 2019-02-04 | End: 2019-10-23 | Stop reason: ALTCHOICE

## 2019-02-13 DIAGNOSIS — I10 ESSENTIAL HYPERTENSION: ICD-10-CM

## 2019-02-13 RX ORDER — LOSARTAN POTASSIUM 50 MG/1
TABLET ORAL
Qty: 30 TABLET | Refills: 11 | Status: SHIPPED | OUTPATIENT
Start: 2019-02-13 | End: 2020-07-22 | Stop reason: SDUPTHER

## 2019-04-09 ENCOUNTER — TELEPHONE (OUTPATIENT)
Dept: FAMILY MEDICINE CLINIC | Age: 64
End: 2019-04-09

## 2019-04-09 NOTE — TELEPHONE ENCOUNTER
L/M for pt to call office. Called patient for pre visit planning. Pt has appt on 4/17/19 at 3:15pm.  Please remind pt of appt date and time and check to see if they had their lab work done.

## 2019-04-24 ENCOUNTER — OFFICE VISIT (OUTPATIENT)
Dept: FAMILY MEDICINE CLINIC | Age: 64
End: 2019-04-24
Payer: COMMERCIAL

## 2019-04-24 VITALS
DIASTOLIC BLOOD PRESSURE: 64 MMHG | BODY MASS INDEX: 38.7 KG/M2 | HEART RATE: 72 BPM | SYSTOLIC BLOOD PRESSURE: 112 MMHG | WEIGHT: 289.3 LBS | RESPIRATION RATE: 18 BRPM

## 2019-04-24 DIAGNOSIS — Z00.00 WELL ADULT EXAM: Primary | ICD-10-CM

## 2019-04-24 DIAGNOSIS — E78.2 MIXED HYPERLIPIDEMIA: ICD-10-CM

## 2019-04-24 DIAGNOSIS — I10 ESSENTIAL HYPERTENSION: ICD-10-CM

## 2019-04-24 DIAGNOSIS — E66.9 OBESITY (BMI 30-39.9): ICD-10-CM

## 2019-04-24 DIAGNOSIS — E11.9 CONTROLLED TYPE 2 DIABETES MELLITUS WITHOUT COMPLICATION, WITHOUT LONG-TERM CURRENT USE OF INSULIN (HCC): ICD-10-CM

## 2019-04-24 PROCEDURE — 99396 PREV VISIT EST AGE 40-64: CPT | Performed by: NURSE PRACTITIONER

## 2019-04-24 RX ORDER — ATORVASTATIN CALCIUM 40 MG/1
40 TABLET, FILM COATED ORAL DAILY
Qty: 30 TABLET | Refills: 11 | Status: SHIPPED | OUTPATIENT
Start: 2019-04-24 | End: 2019-10-23 | Stop reason: ALTCHOICE

## 2019-04-24 RX ORDER — ALOGLIPTIN 25 MG/1
25 TABLET, FILM COATED ORAL DAILY
Qty: 30 TABLET | Refills: 11 | Status: SHIPPED | OUTPATIENT
Start: 2019-04-24 | End: 2019-10-23 | Stop reason: ALTCHOICE

## 2019-04-24 ASSESSMENT — PATIENT HEALTH QUESTIONNAIRE - PHQ9
SUM OF ALL RESPONSES TO PHQ QUESTIONS 1-9: 0
SUM OF ALL RESPONSES TO PHQ QUESTIONS 1-9: 0
2. FEELING DOWN, DEPRESSED OR HOPELESS: 0
SUM OF ALL RESPONSES TO PHQ9 QUESTIONS 1 & 2: 0
1. LITTLE INTEREST OR PLEASURE IN DOING THINGS: 0

## 2019-04-24 ASSESSMENT — ENCOUNTER SYMPTOMS
ABDOMINAL PAIN: 0
COUGH: 0
NAUSEA: 0
SHORTNESS OF BREATH: 0

## 2019-04-24 NOTE — PROGRESS NOTES
Chronic Disease Visit Information    BP Readings from Last 3 Encounters:   04/24/19 112/64   10/18/18 128/76   07/03/18 122/66          Hemoglobin A1C (%)   Date Value   10/18/2018 6.1   04/19/2018 6.6   01/18/2018 7.8 (H)     Microalbumin, Random Urine (mg/dL)   Date Value   10/16/2017 < 1.20     LDL Calculated (mg/dL)   Date Value   01/18/2018 62     HDL (mg/dL)   Date Value   01/18/2018 57     BUN (mg/dL)   Date Value   06/20/2018 13     CREATININE (mg/dL)   Date Value   06/20/2018 1.13     Glucose (mg/dL)   Date Value   06/20/2018 129 (H)            Have you changed or started any medications since your last visit including any over-the-counter medicines, vitamins, or herbal medicines? no   Are you having any side effects from any of your medications? -  no  Have you stopped taking any of your medications? Is so, why? -  no    Have you seen any other physician or provider since your last visit? No  Have you had any other diagnostic tests since your last visit? No  Have you been seen in the emergency room and/or had an admission to a hospital since we last saw you? No  Have you had your annual diabetic retinal (eye) exam? Yes - Records Obtained  Have you had your routine dental cleaning in the past 6 months? n/a    Have you activated your [employer] account? If not, what are your barriers?  No: declined     Patient Care Team:  NILE Hamm CNP as PCP - General (Certified Nurse Practitioner)  NILE Hamm CNP as PCP - S Attributed Provider         Medical History Review  Past Medical, Family, and Social History reviewed and does contribute to the patient presenting condition    Health Maintenance   Topic Date Due    HIV screen  04/26/1971    Shingles Vaccine (1 of 2) 04/26/2006    Diabetic microalbuminuria test  10/16/2018    Lipid screen  01/18/2019    Potassium monitoring  06/20/2019    Creatinine monitoring  06/20/2019    Diabetic foot exam  10/18/2019    A1C test (Diabetic or

## 2019-04-24 NOTE — PATIENT INSTRUCTIONS
playing tennis or team sports. If your doctor says it's okay, do muscle-strengthening exercises at least 2 times a week. ? Take your medicines exactly as prescribed. Call your doctor if you think you are having a problem with your medicine. You will get more details on the specific medicines your doctor prescribes. · Check your blood sugar as often as your doctor recommends. It is important to keep track of any symptoms you have, such as low blood sugar. Also tell your doctor if you have any changes in your activities, diet, or insulin use. · Talk to your doctor before you start taking aspirin every day. Aspirin can help certain people lower their risk of a heart attack or stroke. But taking aspirin isn't right for everyone, because it can cause serious bleeding. · Do not smoke. If you need help quitting, talk to your doctor about stop-smoking programs and medicines. These can increase your chances of quitting for good. · Keep your cholesterol and blood pressure at normal levels. You may need to take one or more medicines to reach your goals. Take them exactly as directed. Do not stop or change a medicine without talking to your doctor first.  When should you call for help? Call 911 anytime you think you may need emergency care. For example, call if:    · You passed out (lost consciousness), or you suddenly become very sleepy or confused. (You may have very low blood sugar.)    Call your doctor now or seek immediate medical care if:    · Your blood sugar is 300 mg/dL or is higher than the level your doctor has set for you.     · You have symptoms of low blood sugar, such as:  ? Sweating. ? Feeling nervous, shaky, and weak. ? Extreme hunger and slight nausea. ? Dizziness and headache.  ? Blurred vision. ?  Confusion.    Watch closely for changes in your health, and be sure to contact your doctor if:    · You often have problems controlling your blood sugar.     · You have symptoms of long-term diabetes problems, such as:  ? New vision changes. ? New pain, numbness, or tingling in your hands or feet. ? Skin problems. Where can you learn more? Go to https://[email or web address]. org and sign in to your [email or web address] account. Enter C553 in the [employer]vate box to learn more about \"Type 2 Diabetes: Care Instructions. \"     If you do not have an account, please click on the \"Sign Up Now\" link. Current as of: July 25, 2018  Content Version: 11.9  © 7641-7704 [employer]. Care instructions adapted under license by [facility] ([facility]). If you have questions about a medical condition or this instruction, always ask your healthcare professional. [address] any warranty or liability for your use of this information. Patient Education        Learning About High Blood Pressure  What is high blood pressure? Blood pressure is a measure of how hard the blood pushes against the walls of your arteries. It's normal for blood pressure to go up and down throughout the day, but if it stays up, you have high blood pressure. Another name for high blood pressure is hypertension. Two numbers tell you your blood pressure. The first number is the systolic pressure. It shows how hard the blood pushes when your heart is pumping. The second number is the diastolic pressure. It shows how hard the blood pushes between heartbeats, when your heart is relaxed and filling with blood. Your doctor will give you a goal for your blood pressure. Your goal will be based on your health and your age. High blood pressure (hypertension) means that the top number stays high, or the bottom number stays high, or both. High blood pressure increases the risk of stroke, heart attack, and other problems. You and your doctor will talk about your risks of these problems based on your blood pressure. What happens when you have high blood pressure? · Blood flows through your arteries with too much force. Over time, this damages the walls of your arteries. But you can't feel it. High blood pressure usually doesn't cause symptoms. · Fat and calcium start to build up in your arteries. This buildup is called plaque. Plaque makes your arteries narrower and stiffer. Blood can't flow through them as easily. · This lack of good blood flow starts to damage some of the organs in your body. This can lead to problems such as coronary artery disease and heart attack, heart failure, stroke, kidney failure, and eye damage. How can you prevent high blood pressure? · Stay at a healthy weight. · Try to limit how much sodium you eat to less than 2,300 milligrams (mg) a day. If you limit your sodium to 1,500 mg a day, you can lower your blood pressure even more. ? Buy foods that are labeled \"unsalted,\" \"sodium-free,\" or \"low-sodium. \" Foods labeled \"reduced-sodium\" and \"light sodium\" may still have too much sodium. ? Flavor your food with garlic, lemon juice, onion, vinegar, herbs, and spices instead of salt. Do not use soy sauce, steak sauce, onion salt, garlic salt, mustard, or ketchup on your food. ? Use less salt (or none) when recipes call for it. You can often use half the salt a recipe calls for without losing flavor. · Be physically active. Get at least 30 minutes of exercise on most days of the week. Walking is a good choice. You also may want to do other activities, such as running, swimming, cycling, or playing tennis or team sports. · Limit alcohol to 2 drinks a day for men and 1 drink a day for women. · Eat plenty of fruits, vegetables, and low-fat dairy products. Eat less saturated and total fats. How is high blood pressure treated? · Your doctor will suggest making lifestyle changes. For example, your doctor may ask you to eat healthy foods, quit smoking, lose extra weight, and be more active. · If lifestyle changes don't help enough, your doctor may recommend that you take medicine.   · When blood pressure

## 2019-04-24 NOTE — PROGRESS NOTES
01/14/2017       Lab Results   Component Value Date    WBC 7.4 06/20/2018    HGB 16.1 06/20/2018    HCT 47.7 06/20/2018    MCV 91.9 06/20/2018     06/20/2018       Health Maintenance   Topic Date Due    HIV screen  04/26/1970    Shingles Vaccine (1 of 2) 04/26/2006    Diabetic microalbuminuria test  10/16/2018    Lipid screen  01/18/2019    Potassium monitoring  06/20/2019    Creatinine monitoring  06/20/2019    Diabetic foot exam  10/18/2019    A1C test (Diabetic or Prediabetic)  10/18/2019    Diabetic retinal exam  04/02/2020    Colon cancer screen colonoscopy  01/09/2027    DTaP/Tdap/Td vaccine (2 - Td) 01/11/2027    Flu vaccine  Completed    Pneumococcal 0-64 years Vaccine  Completed    Hepatitis C screen  Completed       Immunization History   Administered Date(s) Administered    Influenza, Quadv, 3 Years and older, IM (Fluzone 3 yrs and older or Afluria 5 yrs and older) 01/11/2017, 10/18/2018    Pneumococcal Polysaccharide ([id number]) 01/11/2017    Tdap (Boostrix, Adacel) 01/11/2017       Review of Systems   Constitutional: Negative for chills and fever. HENT: Negative. Respiratory: Negative for cough and shortness of breath. Gastrointestinal: Negative for abdominal pain and nausea. Skin: Negative for rash. Neurological: Negative for light-headedness. Psychiatric/Behavioral: Negative. Objective:   Physical Exam   Constitutional: He is oriented to person, place, and time. Vital signs are normal. He appears well-developed and well-nourished. He is active. He does not have a sickly appearance. No distress. HENT:   Right Ear: Tympanic membrane normal.   Left Ear: Tympanic membrane normal.   Nose: Nose normal.   Mouth/Throat: Oropharynx is clear and moist and mucous membranes are normal.   Cardiovascular: Normal rate, regular rhythm, S1 normal, S2 normal, normal heart sounds and normal pulses. Exam reveals no S3. No murmur heard.   Pulmonary/Chest: Effort normal and breath sounds normal. He has no decreased breath sounds. He has no wheezes. He has no rhonchi. Abdominal: Soft. Bowel sounds are normal. There is no tenderness. Neurological: He is alert and oriented to person, place, and time. Psychiatric: He has a normal mood and affect. His behavior is normal.       Assessment:       Diagnosis Orders   1. Well adult exam  CBC    Lipid Panel    Comprehensive Metabolic Panel    Hemoglobin A1C    Psa screening    MICROALBUMIN, RANDOM URINE (W/O CREATININE)   2. Controlled type 2 diabetes mellitus without complication, without long-term current use of insulin (HCC)  alogliptin (NESINA) 25 MG TABS tablet    empagliflozin (JARDIANCE) 25 MG tablet   3. Mixed hyperlipidemia  atorvastatin (LIPITOR) 40 MG tablet   4. Essential hypertension     5. Obesity (BMI 30-39. 9)             Plan:      Chronic conditions stable  Labs re-ordered  Refills as above  Preventative UTD  Shingle vaccine discussed  Healthy Lifestyles discussed  RTO in 6 months - A1C in [facility] received counseling on the following healthy behaviors: nutrition and exercise  Reviewed prior labs and health maintenance  Continue current medications, diet and exercise. Discussed use, benefit, and side effects of prescribed medications. Barriers to medication compliance addressed. Patient given educational materials - see patient instructions  Was a self-tracking handout given in paper form or via WordRaket? No:     Requested Prescriptions     Signed Prescriptions Disp Refills    alogliptin (NESINA) 25 MG TABS tablet 30 tablet 11     Sig: Take 1 tablet by mouth daily    empagliflozin (JARDIANCE) 25 MG tablet 30 tablet 11     Sig: Take 25 mg by mouth daily    atorvastatin (LIPITOR) 40 MG tablet 30 tablet 11     Sig: Take 1 tablet by mouth daily       All patient questions answered. Patient voiced understanding. Quality Measures    Body mass index is 38.7 kg/m². Elevated.  Weight control planned discussed Healthy diet and regular exercise. BP: 112/64 Blood pressure is normal. Treatment plan consists of No treatment change needed.     Lab Results   Component Value Date    LDLCALC 62 01/18/2018    LDLDIRECT 178 (H) 07/02/2015    (goal LDL reduction with dx if diabetes is 50% LDL reduction)      PHQ Scores 4/24/2019 1/19/2018 1/11/2017   PHQ2 Score 0 0 0   PHQ9 Score 0 0 0     Interpretation of Total Score Depression Severity: 1-4 = Minimal depression, 5-9 = Mild depression, 10-14 = Moderate depression, 15-19 = Moderately severe depression, 20-27 = Severe depression

## 2019-10-15 ENCOUNTER — TELEPHONE (OUTPATIENT)
Dept: FAMILY MEDICINE CLINIC | Age: 64
End: 2019-10-15

## 2019-10-23 ENCOUNTER — OFFICE VISIT (OUTPATIENT)
Dept: FAMILY MEDICINE CLINIC | Age: 64
End: 2019-10-23

## 2019-10-23 VITALS
WEIGHT: 275.9 LBS | HEIGHT: 73 IN | SYSTOLIC BLOOD PRESSURE: 122 MMHG | HEART RATE: 88 BPM | OXYGEN SATURATION: 97 % | RESPIRATION RATE: 20 BRPM | DIASTOLIC BLOOD PRESSURE: 78 MMHG | BODY MASS INDEX: 36.57 KG/M2 | TEMPERATURE: 98.1 F

## 2019-10-23 DIAGNOSIS — Z12.5 SCREENING PSA (PROSTATE SPECIFIC ANTIGEN): ICD-10-CM

## 2019-10-23 DIAGNOSIS — E78.2 MIXED HYPERLIPIDEMIA: ICD-10-CM

## 2019-10-23 DIAGNOSIS — I10 ESSENTIAL HYPERTENSION: ICD-10-CM

## 2019-10-23 DIAGNOSIS — E11.9 CONTROLLED TYPE 2 DIABETES MELLITUS WITHOUT COMPLICATION, WITHOUT LONG-TERM CURRENT USE OF INSULIN (HCC): Primary | ICD-10-CM

## 2019-10-23 DIAGNOSIS — E66.9 OBESITY (BMI 30-39.9): ICD-10-CM

## 2019-10-23 DIAGNOSIS — J06.9 VIRAL URI: ICD-10-CM

## 2019-10-23 PROCEDURE — 99214 OFFICE O/P EST MOD 30 MIN: CPT | Performed by: NURSE PRACTITIONER

## 2019-10-23 ASSESSMENT — ENCOUNTER SYMPTOMS: SHORTNESS OF BREATH: 0

## 2019-10-24 ASSESSMENT — ENCOUNTER SYMPTOMS
RHINORRHEA: 1
SORE THROAT: 1
COUGH: 1

## 2020-04-17 ENCOUNTER — HOSPITAL ENCOUNTER (OUTPATIENT)
Age: 65
Discharge: HOME | End: 2020-04-17
Payer: MEDICAID

## 2020-04-17 ENCOUNTER — HOSPITAL ENCOUNTER (EMERGENCY)
Age: 65
Discharge: HOME | End: 2020-04-17
Payer: MEDICAID

## 2020-04-17 VITALS
HEART RATE: 74 BPM | OXYGEN SATURATION: 98 % | SYSTOLIC BLOOD PRESSURE: 166 MMHG | DIASTOLIC BLOOD PRESSURE: 87 MMHG | WEIGHT: 270 LBS | TEMPERATURE: 98.1 F | BODY MASS INDEX: 36.12 KG/M2 | RESPIRATION RATE: 17 BRPM

## 2020-04-17 LAB
ALBUMIN SERPL-MCNC: 4.2 G/DL (ref 3.5–5.1)
ALP BLD-CCNC: 112 U/L (ref 38–126)
ALT SERPL-CCNC: 36 U/L (ref 11–66)
ANION GAP SERPL CALCULATED.3IONS-SCNC: 14 MEQ/L (ref 8–16)
AST SERPL-CCNC: 33 U/L (ref 5–40)
AVERAGE GLUCOSE: 231 MG/DL (ref 70–126)
BILIRUB SERPL-MCNC: 0.6 MG/DL (ref 0.3–1.2)
BUN BLDV-MCNC: 10 MG/DL (ref 7–22)
CALCIUM SERPL-MCNC: 9.5 MG/DL (ref 8.5–10.5)
CHLORIDE BLD-SCNC: 94 MEQ/L (ref 98–111)
CHOLESTEROL, TOTAL: 259 MG/DL (ref 100–199)
CO2: 26 MEQ/L (ref 23–33)
CREAT SERPL-MCNC: 1 MG/DL (ref 0.4–1.2)
CREATININE, URINE: 209.6 MG/DL
ERYTHROCYTE [DISTWIDTH] IN BLOOD BY AUTOMATED COUNT: 11.9 % (ref 11.5–14.5)
ERYTHROCYTE [DISTWIDTH] IN BLOOD BY AUTOMATED COUNT: 40.9 FL (ref 35–45)
GFR SERPL CREATININE-BSD FRML MDRD: 75 ML/MIN/1.73M2
GLUCOSE BLD-MCNC: 268 MG/DL (ref 70–108)
HBA1C MFR BLD: 9.7 % (ref 4.4–6.4)
HCT VFR BLD CALC: 51.6 % (ref 42–52)
HDLC SERPL-MCNC: 46 MG/DL
HEMOGLOBIN: 17.4 GM/DL (ref 14–18)
LDL CHOLESTEROL CALCULATED: 185 MG/DL
MCH RBC QN AUTO: 31.1 PG (ref 26–33)
MCHC RBC AUTO-ENTMCNC: 33.7 GM/DL (ref 32.2–35.5)
MCV RBC AUTO: 92.1 FL (ref 80–94)
MICROALBUMIN UR-MCNC: < 1.2 MG/DL
MICROALBUMIN/CREAT UR-RTO: 6 MG/G (ref 0–30)
PLATELET # BLD: 333 THOU/MM3 (ref 130–400)
PMV BLD AUTO: 10.5 FL (ref 9.4–12.4)
POTASSIUM SERPL-SCNC: 4.4 MEQ/L (ref 3.5–5.2)
PROSTATE SPECIFIC ANTIGEN: 0.64 NG/ML (ref 0–1)
RBC # BLD: 5.6 MILL/MM3 (ref 4.7–6.1)
SODIUM BLD-SCNC: 134 MEQ/L (ref 135–145)
TOTAL PROTEIN: 7.9 G/DL (ref 6.1–8)
TRIGL SERPL-MCNC: 139 MG/DL (ref 0–199)
WBC # BLD: 6.2 THOU/MM3 (ref 4.8–10.8)

## 2020-04-17 PROCEDURE — G0103 PSA SCREENING: HCPCS

## 2020-04-17 PROCEDURE — 36415 COLL VENOUS BLD VENIPUNCTURE: CPT

## 2020-04-17 PROCEDURE — 85027 COMPLETE CBC AUTOMATED: CPT

## 2020-04-17 PROCEDURE — 82043 UR ALBUMIN QUANTITATIVE: CPT

## 2020-04-17 PROCEDURE — 99282 EMERGENCY DEPT VISIT SF MDM: CPT

## 2020-04-17 PROCEDURE — 10060 I&D ABSCESS SIMPLE/SINGLE: CPT

## 2020-04-17 PROCEDURE — 80061 LIPID PANEL: CPT

## 2020-04-17 PROCEDURE — 83036 HEMOGLOBIN GLYCOSYLATED A1C: CPT

## 2020-04-17 PROCEDURE — 80053 COMPREHEN METABOLIC PANEL: CPT

## 2020-04-17 ASSESSMENT — ENCOUNTER SYMPTOMS
SINUS PAIN: 0
EYE REDNESS: 0
SORE THROAT: 0
BACK PAIN: 0
EYE PAIN: 0
ABDOMINAL PAIN: 0
SHORTNESS OF BREATH: 0
COUGH: 0
VOMITING: 0
PHOTOPHOBIA: 0
SINUS PRESSURE: 0
DIARRHEA: 0
CONSTIPATION: 0
RHINORRHEA: 0
NAUSEA: 0
WHEEZING: 0
CHEST TIGHTNESS: 0

## 2020-04-17 NOTE — ED PROVIDER NOTES
well, no immediate complications    : (None if blank)       CLINICAL       1.  Abscess          DISPOSITION/PLAN   DISPOSITION    Discharge home  Follow-up with PCP on 4- for for packing removal and wound evaluation    PATIENT REFERRED TO:  [facility] EMERGENCY DEPT  [address] [address]  [phone]  Go to   If symptoms worsen, worsening pain, fevers, chills, signs of infection    NILE Padgett - CNP  [address] [place] [address]  [phone]    Go in 1 week  As needed for removal of packing and wound evaluation      DISCHARGE MEDICATIONS:  Discharge Medication List as of 4/17/2020  3:50 PM                 (Please note that portions of this note were completed with a voice recognition program.  Efforts were made to edit the dictations but occasionallywords are mis-transcribed.)    Leny Cuevas PA-C   (electronically signed)            KRISTEN Romero  04/17/20 1556

## 2020-04-20 ENCOUNTER — TELEPHONE (OUTPATIENT)
Dept: FAMILY MEDICINE CLINIC | Age: 65
End: 2020-04-20

## 2020-04-20 RX ORDER — ATORVASTATIN CALCIUM 40 MG/1
40 TABLET, FILM COATED ORAL DAILY
Qty: 30 TABLET | Refills: 11 | Status: SHIPPED | OUTPATIENT
Start: 2020-04-20 | End: 2021-09-09 | Stop reason: SDUPTHER

## 2020-04-20 RX ORDER — EMPAGLIFLOZIN 25 MG/1
25 TABLET, FILM COATED ORAL DAILY
Qty: 30 TABLET | Refills: 11 | Status: SHIPPED | OUTPATIENT
Start: 2020-04-20 | End: 2021-05-27 | Stop reason: SDUPTHER

## 2020-04-20 RX ORDER — EXENATIDE 2 MG/.65ML
1 INJECTION, SUSPENSION, EXTENDED RELEASE SUBCUTANEOUS
Qty: 4 PEN | Refills: 3 | Status: SHIPPED | OUTPATIENT
Start: 2020-04-20 | End: 2021-01-26

## 2020-04-20 RX ORDER — PIOGLITAZONEHYDROCHLORIDE 45 MG/1
TABLET ORAL
Qty: 30 TABLET | Refills: 11 | Status: SHIPPED | OUTPATIENT
Start: 2020-04-20 | End: 2021-05-27 | Stop reason: SDUPTHER

## 2020-04-20 NOTE — LETTER
April 20, 2020    Sunil Carreon  [address]    Dear Harika Flower,    This letter is regarding your Emergency Department (ED) visit at [address] [address] on 4/17/20. Bianca Campos wanted to make sure that you understand your discharge instructions and that you were able to fill any prescriptions that may have been ordered for you. Please contact the office at the above phone number if the ED advised you to follow up with Bianca Campos, or if you have any further questions or needs. Also did you know -   *Visiting the ED for a non-emergency could result in higher co-pays than you would normally be subject to paying? [facility]) practices can often offer you an appointment on the same day that you call for acute issues. *We have some [facility] offices that offer Walk-in appointments; check our website for availability in your community, [email or web address].      *Evisits are now available for patients for through [address] If you do not have MyChart and are interested, please contact the office and a staff member may assist you or go to [email or web address].     Sincerely,   NILE Kimbrough CNP and your [facility][note 3]

## 2020-04-20 NOTE — TELEPHONE ENCOUNTER
----- Message from NILE Gautam CNP sent at 4/20/2020 12:39 PM EDT -----  Notify labs good except sugar and LIPID elevated. He was to be on Metformin 1000 mg BID, Januvia 100 mg, Jardiance 25 mg and Actos 45 mg for his DM and Lipitor 40 mg for his LIPIDs. What has he not been taking?

## 2020-04-20 NOTE — TELEPHONE ENCOUNTER
Looks like two messages were merged together in this telephone encounter. Regarding test results, pt was notified and voiced understanding. He ran out of the Metformin last Wednesday. He ran out of the Januvia and Actos about a month or so ago. Pt is still taking the Jardiance and Lipitor. Regarding the refills of medications. Pt was notified and agrees to try the Bydureon. If no call back pt will check with his pharmacy after 5pm. Please advise. Pt will keep his appt in June and aware a repeat A1c will be done at that time. Appt notes updated regarding.

## 2020-04-21 ENCOUNTER — OFFICE VISIT (OUTPATIENT)
Dept: PRIMARY CARE CLINIC | Age: 65
End: 2020-04-21
Payer: MEDICAID

## 2020-04-21 VITALS
DIASTOLIC BLOOD PRESSURE: 60 MMHG | OXYGEN SATURATION: 97 % | TEMPERATURE: 98.7 F | HEART RATE: 86 BPM | HEIGHT: 73 IN | SYSTOLIC BLOOD PRESSURE: 110 MMHG | WEIGHT: 273 LBS | BODY MASS INDEX: 36.18 KG/M2 | RESPIRATION RATE: 18 BRPM

## 2020-04-21 PROCEDURE — 1036F TOBACCO NON-USER: CPT | Performed by: FAMILY MEDICINE

## 2020-04-21 PROCEDURE — 99213 OFFICE O/P EST LOW 20 MIN: CPT | Performed by: FAMILY MEDICINE

## 2020-04-21 PROCEDURE — G8417 CALC BMI ABV UP PARAM F/U: HCPCS | Performed by: FAMILY MEDICINE

## 2020-04-21 PROCEDURE — 3017F COLORECTAL CA SCREEN DOC REV: CPT | Performed by: FAMILY MEDICINE

## 2020-04-21 PROCEDURE — G8427 DOCREV CUR MEDS BY ELIG CLIN: HCPCS | Performed by: FAMILY MEDICINE

## 2020-04-21 ASSESSMENT — PATIENT HEALTH QUESTIONNAIRE - PHQ9
SUM OF ALL RESPONSES TO PHQ QUESTIONS 1-9: 0
SUM OF ALL RESPONSES TO PHQ9 QUESTIONS 1 & 2: 0
2. FEELING DOWN, DEPRESSED OR HOPELESS: 0
SUM OF ALL RESPONSES TO PHQ QUESTIONS 1-9: 0
1. LITTLE INTEREST OR PLEASURE IN DOING THINGS: 0

## 2020-04-21 NOTE — PROGRESS NOTES
Past Medical History:   Diagnosis Date    CVA (cerebral vascular accident) ([facility] [facility] 06/28/2015    [facility]     Hypertension     Kidney stones 1986         Past Surgical History:   Procedure Laterality Date    COLONOSCOPY      TONSILLECTOMY      1963    TUMOR EXCISION Left 2009? mid back, benign         Allergies   Allergen Reactions    Terramycin [Oxytetracycline-Polymyxin B] Swelling         Social History     Tobacco Use    Smoking status: Never Smoker    Smokeless tobacco: Never Used   Substance Use Topics    Alcohol use: Yes     Comment: some weekends         Family History   Problem Relation Age of Onset    Diabetes Mother     Dementia Mother     Diabetes Father     Heart Disease Father     High Blood Pressure Father     Diabetes Maternal Grandmother     Heart Disease Paternal Grandmother     Heart Disease Paternal Grandfather          I have reviewed the patient's past medical history, past surgical history, allergies, medications, social and family history and I have made updates where appropriate.       Review of Systems  Positive responses are highlighted in bold    Constitutional:  Fever, Chills, Night Sweats, Fatigue, Unexpected changes in weight  HENT:  Ear pain, Tinnitus, Nosebleeds, Trouble swallowing, Hearing loss, Sore throat  Cardiovascular:  Chest Pain, Palpitations, Orthopnea, Paroxysmal Nocturnal Dyspnea  Respiratory:  Cough, Wheezing, Shortness of breath, Chest tightness, Apnea  Gastrointestinal:  Nausea, Vomiting, Diarrhea, Constipation, Heartburn, Blood in stool  Genitourinary:  Difficulty or painful urination, Flank pain, Change in frequency, Urgency  Skin:  Color change, Rash, Itching, Wound  Musculoskeletal:  Joint pain, Back pain, Gait problems, Joint swelling, Myalgias  Neurological:  Dizziness, Headaches, Presyncope, Numbness, Seizures, Tremors  Endocrine:  Heat Intolerance, Cold Intolerance, Polydipsia, Polyphagia, Polyuria      PHYSICAL EXAM:  Vitals: 04/21/20 1342   BP: 110/60   Site: Left Upper Arm   Position: Sitting   Pulse: 86   Resp: 18   Temp: 98.7 °F (37.1 °C)   TempSrc: Oral   SpO2: 97%   Weight: 273 lb (123.8 kg)   Height: 6' 1\" (1.854 m)     Body mass index is 36.02 kg/m². Pain Score:   0 - No pain    VS Reviewed  General Appearance: A&O x 3, No acute distress,well developed and well- nourished  Eyes: pupils equal, round, and reactive to light, extraocular eye movements intact, conjunctivae and eye lids without erythema  ENT: external ear and ear canal clear bilaterally, TMs intact and regular, nose without deformity, nasal mucosa and turbinates normal without polyps, oropharynx normal, dentition is normal for age  Neck: supple and non-tender without mass, no thyromegaly or thyroid nodules, no cervical lymphadenopathy  Pulmonary/Chest: clear to auscultation bilaterally- no wheezes, rales or rhonchi, normal air movement, no respiratory distress or retractions  Cardiovascular: S1 and S2 auscultated w/ RRR. No murmurs, rubs, clicks, or gallops, distal pulses intact. Abdomen: soft, non-tender, non-distended, bowel sounds physiologic,  no rebound or guarding, no masses or hernias noted. Liver and spleen without enlargement. Extremities: no cyanosis, clubbing or edema of the lower extremities. Skin: warm and dry, 3 cm x 2 cm small area of redness on mid upper back, between shoulder blades. No drainage, non-tender. Packing removed. No drainage. ASSESSMENT & PLAN  1. Abscess of back    Healing well  Packing removed  Routine wound care reviewed  F/u if does not con't to heal  Reviewed ER precautions, pt understands. DISPOSITION    Return if symptoms worsen or fail to improve. Cecilia Martinez released without restrictions. PATIENT COUNSELING    Patient given educational materials on: See Attached    Barriers to learning and self management: none    Discussed use, benefit, and side effects of prescribed medications.   Barriers to medication

## 2020-04-21 NOTE — PATIENT INSTRUCTIONS
for help? Call your doctor now or seek immediate medical care if:    · You have signs of worsening infection, such as:  ? Increased pain, swelling, warmth, or redness. ? Red streaks leading from the infected skin. ? Pus draining from the wound. ? A fever.    Watch closely for changes in your health, and be sure to contact your doctor if:    · You do not get better as expected. Where can you learn more? Go to [email or web address] and sign in to your [facility] account. Enter S870 in the Level box to learn more about \"Skin Abscess: Care Instructions. \"     If you do not have an account, please click on the \"Sign Up Now\" link. Current as of: October 30, 2019Content Version: 12.4  © 5445-8460 Healthwise, Incorporated. Care instructions adapted under license by [facility] ([facility]). If you have questions about a medical condition or this instruction, always ask your healthcare professional. [address] [place] [address] any warranty or liability for your use of this information.

## 2020-07-22 ENCOUNTER — OFFICE VISIT (OUTPATIENT)
Dept: FAMILY MEDICINE CLINIC | Age: 65
End: 2020-07-22
Payer: MEDICAID

## 2020-07-22 VITALS
HEART RATE: 112 BPM | WEIGHT: 270.8 LBS | TEMPERATURE: 97.3 F | SYSTOLIC BLOOD PRESSURE: 126 MMHG | RESPIRATION RATE: 16 BRPM | DIASTOLIC BLOOD PRESSURE: 80 MMHG | BODY MASS INDEX: 35.73 KG/M2

## 2020-07-22 PROCEDURE — 2022F DILAT RTA XM EVC RTNOPTHY: CPT | Performed by: NURSE PRACTITIONER

## 2020-07-22 PROCEDURE — 3046F HEMOGLOBIN A1C LEVEL >9.0%: CPT | Performed by: NURSE PRACTITIONER

## 2020-07-22 PROCEDURE — G8427 DOCREV CUR MEDS BY ELIG CLIN: HCPCS | Performed by: NURSE PRACTITIONER

## 2020-07-22 PROCEDURE — 1036F TOBACCO NON-USER: CPT | Performed by: NURSE PRACTITIONER

## 2020-07-22 PROCEDURE — 3017F COLORECTAL CA SCREEN DOC REV: CPT | Performed by: NURSE PRACTITIONER

## 2020-07-22 PROCEDURE — G8417 CALC BMI ABV UP PARAM F/U: HCPCS | Performed by: NURSE PRACTITIONER

## 2020-07-22 PROCEDURE — 99214 OFFICE O/P EST MOD 30 MIN: CPT | Performed by: NURSE PRACTITIONER

## 2020-07-22 RX ORDER — LOSARTAN POTASSIUM 50 MG/1
TABLET ORAL
Qty: 30 TABLET | Refills: 11 | Status: SHIPPED | OUTPATIENT
Start: 2020-07-22 | End: 2021-08-26 | Stop reason: SDUPTHER

## 2020-07-22 ASSESSMENT — ENCOUNTER SYMPTOMS
CONSTIPATION: 1
ABDOMINAL PAIN: 0
NAUSEA: 0
SHORTNESS OF BREATH: 0
COUGH: 0

## 2020-07-22 NOTE — PROGRESS NOTES
Chronic Disease Visit Information    BP Readings from Last 3 Encounters:   04/21/20 110/60   04/17/20 (!) 166/87   10/23/19 122/78          Hemoglobin A1C (%)   Date Value   04/17/2020 9.7 (H)   10/18/2018 6.1   04/19/2018 6.6     Microalbumin, Random Urine (mg/dL)   Date Value   04/17/2020 < 1.20     LDL Calculated (mg/dL)   Date Value   04/17/2020 185     HDL (mg/dL)   Date Value   04/17/2020 46     BUN (mg/dL)   Date Value   04/17/2020 10     CREATININE (mg/dL)   Date Value   04/17/2020 1.0     Glucose (mg/dL)   Date Value   04/17/2020 268 (H)   06/20/2018 129 (H)            Have you changed or started any medications since your last visit including any over-the-counter medicines, vitamins, or herbal medicines? no   Are you having any side effects from any of your medications? -  yes  Have you stopped taking any of your medications? Is so, why? -  yes - side effects    Have you seen any other physician or provider since your last visit? Yes - Records Obtained  Have you had any other diagnostic tests since your last visit? Yes - Records Obtained  Have you been seen in the emergency room and/or had an admission to a hospital since we last saw you? Yes, records obtained  Have you had your annual diabetic retinal (eye) exam? No  Have you had your routine dental cleaning in the past 6 months? no    Have you activated your [employer] Course account? If not, what are your barriers?  No: pt choice and no computer access    Patient Care Team:  NILE Stokes CNP as PCP - General (Certified Nurse Practitioner)  NILE Stokes CNP as PCP [facility]         Medical History Review  Past Medical, Family, and Social History reviewed and does contribute to the patient presenting condition    Health Maintenance   Topic Date Due    HIV screen  04/26/1971    Shingles Vaccine (1 of 2) 04/26/2006    Diabetic retinal exam  04/02/2020    A1C test (Diabetic or Prediabetic)  07/17/2020    Flu vaccine (1) 09/01/2020    Diabetic foot exam  10/23/2020    Diabetic microalbuminuria test  04/17/2021    Lipid screen  04/17/2021    Potassium monitoring  04/17/2021    Creatinine monitoring  04/17/2021    Colon cancer screen colonoscopy  01/09/2027    DTaP/Tdap/Td vaccine (2 - Td) 01/11/2027    Pneumococcal 0-64 years Vaccine  Completed    Hepatitis C screen  Completed    Hepatitis A vaccine  Aged Out    Hib vaccine  Aged Out    Meningococcal (ACWY) vaccine  Aged Out

## 2020-07-22 NOTE — PROGRESS NOTES
Subjective:      Patient ID: Liam Cartwright is a 60 y.o. male. HPI  : 6 month Follow Up    Chief Complaint   Patient presents with    6 Month Follow-Up    Constipation     with Bydureon, so pt stopped using this med last week       Patient Active Problem List   Diagnosis    Controlled type 2 diabetes mellitus without complication, without long-term current use of insulin ([address]    Essential hypertension    History of CVA (cerebrovascular accident)    Mixed hyperlipidemia    Obesity (BMI 30-39. 9)     Overall doing well. Denies CP, SOB or chest tightness. No smoking hx. COLONOSCOPY - 2016    BP Readings from Last 3 Encounters:   07/22/20 126/80   04/21/20 110/60   04/17/20 (!) 166/87       BP wnl. Losartan 50 mg. Wt Readings from Last 3 Encounters:   07/22/20 270 lb 12.8 oz (122.8 kg)   04/21/20 273 lb (123.8 kg)   04/17/20 270 lb (122.5 kg)       He is on Metformin 1000 mg BID, Jardiance 25 mg, Actos 45 mg and Bydureon 2 mg. Tolerating well. Body mass index is 35.73 kg/m². Last took Bydureon last week. Causing constipation. Was not compliant with medication due to losing insurance for elevated A1C. Lab Results   Component Value Date    LABA1C 9.7 (H) 04/17/2020    LABA1C 6.1 10/18/2018    LABA1C 6.6 04/19/2018     Lab Results   Component Value Date     07/02/2015       Lipitor 40 mg. Tolerating well.      No components found for: CHLPL  Lab Results   Component Value Date    TRIG 139 04/17/2020    TRIG 65 01/18/2018    TRIG 102 07/13/2017     Lab Results   Component Value Date    HDL 46 04/17/2020    HDL 57 01/18/2018    HDL 44 07/13/2017     Lab Results   Component Value Date    LDLCALC 185 04/17/2020    LDLCALC 62 01/18/2018    LDLCALC 158 07/13/2017     No results found for: LABVLDL      Chemistry        Component Value Date/Time     (L) 04/17/2020 1420    K 4.4 04/17/2020 1420    CL 94 (L) 04/17/2020 1420    CO2 26 04/17/2020 1420    BUN 10 04/17/2020 1420 CREATININE 1.0 04/17/2020 1420        Component Value Date/Time    CALCIUM 9.5 04/17/2020 1420    ALKPHOS 112 04/17/2020 1420    AST 33 04/17/2020 1420    ALT 36 04/17/2020 1420    BILITOT 0.6 04/17/2020 1420          Lab Results   Component Value Date    TSH 2.690 01/14/2017       Lab Results   Component Value Date    WBC 6.2 04/17/2020    HGB 17.4 04/17/2020    HCT 51.6 04/17/2020    MCV 92.1 04/17/2020     04/17/2020       Health Maintenance   Topic Date Due    HIV screen  04[birth year]26/1971    Shingles Vaccine (1 of 2) 04/26/2006    Diabetic retinal exam  04/02/2020    A1C test (Diabetic or Prediabetic)  07/17/2020    Flu vaccine (1) 09/01/2020    Diabetic foot exam  10/23/2020    Diabetic microalbuminuria test  04/17/2021    Lipid screen  04/17/2021    Potassium monitoring  04/17/2021    Creatinine monitoring  04/17/2021    Colon cancer screen colonoscopy  01/09/2027    DTaP/Tdap/Td vaccine (2 - Td) 01/11/2027    Pneumococcal 0-64 years Vaccine  Completed    Hepatitis C screen  Completed    Hepatitis A vaccine  Aged Out    Hib vaccine  Aged Out    Meningococcal (ACWY) vaccine  Aged [employer] History   Administered Date(s) Administered    Influenza, Quadv, IM, (6 mo and older Fluzone, Flulaval, Fluarix and 3 yrs and older Afluria) 01/11/2017, 10/18/2018    Pneumococcal Polysaccharide ([id number]) 01/11/2017    Tdap (Boostrix, Adacel) 01/11/2017       Review of Systems   Constitutional: Negative for chills and fever. HENT: Negative. Respiratory: Negative for cough and shortness of breath. Cardiovascular: Negative for chest pain. Gastrointestinal: Positive for constipation. Negative for abdominal pain and nausea. Skin: Negative for rash. Neurological: Negative for dizziness, light-headedness and headaches. Psychiatric/Behavioral: Negative. Objective:   Physical Exam  Constitutional:       General: He is not in acute distress.   Eyes:      Pupils: Pupils are equal, round, and reactive to light. Neck:      Musculoskeletal: Normal range of motion and neck supple. Cardiovascular:      Rate and Rhythm: Normal rate and regular rhythm. Heart sounds: No murmur. Pulmonary:      Effort: Pulmonary effort is normal.      Breath sounds: Normal breath sounds. No wheezing. Abdominal:      General: Bowel sounds are normal. There is no distension. Palpations: Abdomen is soft. Tenderness: There is no abdominal tenderness. Musculoskeletal: Normal range of motion. General: No tenderness. Skin:     General: Skin is warm and dry. Findings: No rash. Assessment:       Diagnosis Orders   1. Controlled type 2 diabetes mellitus without complication, without long-term current use of insulin ([facility])  Hemoglobin A1C   2. Essential hypertension  losartan (COZAAR) 50 MG tablet   3. Mixed hyperlipidemia     4. Obesity (BMI 30-39. 9)             Plan:      Chronic conditions stable  Refills as above  Repeat A1C  Continue same medications for now   - look at possible switch to Januvia off Bydureon   - if still elevated look at Lantus  Shingle vaccine discussed  Healthy Lifestyles discussed  RTO in 6 months     Antoinette Born received counseling on the following healthy behaviors: nutrition and exercise  Reviewed prior labs and health maintenance  Continue current medications, diet and exercise. Discussed use, benefit, and side effects of prescribed medications. Barriers to medication compliance addressed. Patient given educational materials - see patient instructions  Was a self-tracking handout given in paper form or via Wattiot? No:     Requested Prescriptions     Signed Prescriptions Disp Refills    losartan (COZAAR) 50 MG tablet 30 tablet 11     Sig: take 1 tablet by mouth once daily       All patient questions answered. Patient voiced understanding. Quality Measures    Body mass index is 35.73 kg/m². Elevated.  Weight control planned discussed Healthy diet and regular exercise. BP: 126/80 Blood pressure is normal. Treatment plan consists of No treatment change needed.     Lab Results   Component Value Date    LDLCALC 185 04/17/2020    LDLDIRECT 178 (H) 07/02/2015    (goal LDL reduction with dx if diabetes is 50% LDL reduction)      PHQ Scores 4/21/2020 4/24/2019 1/19/2018 1/11/2017   PHQ2 Score 0 0 0 0   PHQ9 Score 0 0 0 0     Interpretation of Total Score Depression Severity: 1-4 = Minimal depression, 5-9 = Mild depression, 10-14 = Moderate depression, 15-19 = Moderately severe depression, 20-27 = Severe depression

## 2020-08-31 NOTE — TELEPHONE ENCOUNTER
Pt was notified and voiced understanding. Consent: The patient's consent was obtained including but not limited to risks of crusting, scabbing, blistering, scarring, darker or lighter pigmentary change, recurrence, incomplete removal and infection. Number Of Freeze-Thaw Cycles: 2 freeze-thaw cycles Detail Level: Simple Duration Of Freeze Thaw-Cycle (Seconds): 5 Render Note In Bullet Format When Appropriate: No Post-Care Instructions: I reviewed with the patient in detail post-care instructions. Patient is to wear sunprotection, and avoid picking at any of the treated lesions. Pt may apply Vaseline to crusted or scabbing areas.

## 2020-09-24 ENCOUNTER — TELEPHONE (OUTPATIENT)
Dept: FAMILY MEDICINE CLINIC | Age: 65
End: 2020-09-24

## 2020-09-24 NOTE — TELEPHONE ENCOUNTER
PA request received from pharmacy for Jardiance 25mg tablet. PA submitted online at [email or web address] and pending review.

## 2021-01-20 ENCOUNTER — TELEPHONE (OUTPATIENT)
Dept: FAMILY MEDICINE CLINIC | Age: 66
End: 2021-01-20

## 2021-01-20 NOTE — TELEPHONE ENCOUNTER
[address] [place] [address] and spoke with the [address] [place] [address] and updated her that we have approval for the patients Jardiance until 9/24/21. RPMANNIE ran the script and it went through and she is getting it ready for the patient. Called and updated the patient, he voiced understanding.

## 2021-01-20 NOTE — TELEPHONE ENCOUNTER
PA.  He is on Metformin 1000 mg BID, Jardiance 25 mg, Actos 45 mg and Bydureon 2 mg.   This is a continued request.

## 2021-01-20 NOTE — TELEPHONE ENCOUNTER
PATIENT: Fozia Blackburn    PROVIDER: DEBRA CALDERÓN    PATIENT SAYS THAT PHARMACY TOLD HIM THAT THEY WILL NEED A PRE AUTHORIZATION BEFORE THEY ARE ABLE TO FILL HIS PRESCRIPTION. PATIENT ASK THAT YOU CALL HIM ANYTIME BEFORE 2:15PM BECAUSE HE DOES HAVE TO BE AT WORK AT 3.      PRESCRIPTION: JARDIANCE 25 MG 1 TAB DAILY     PHARMACY IS ALREADY VERIFIED

## 2021-01-26 ENCOUNTER — OFFICE VISIT (OUTPATIENT)
Dept: FAMILY MEDICINE CLINIC | Age: 66
End: 2021-01-26
Payer: MEDICAID

## 2021-01-26 ENCOUNTER — TELEPHONE (OUTPATIENT)
Dept: FAMILY MEDICINE CLINIC | Age: 66
End: 2021-01-26

## 2021-01-26 VITALS
TEMPERATURE: 97.6 F | DIASTOLIC BLOOD PRESSURE: 70 MMHG | WEIGHT: 275.3 LBS | SYSTOLIC BLOOD PRESSURE: 128 MMHG | RESPIRATION RATE: 16 BRPM | HEART RATE: 68 BPM | BODY MASS INDEX: 36.32 KG/M2

## 2021-01-26 DIAGNOSIS — I10 ESSENTIAL HYPERTENSION: ICD-10-CM

## 2021-01-26 DIAGNOSIS — E11.9 CONTROLLED TYPE 2 DIABETES MELLITUS WITHOUT COMPLICATION, WITHOUT LONG-TERM CURRENT USE OF INSULIN (HCC): ICD-10-CM

## 2021-01-26 DIAGNOSIS — R73.9 HYPERGLYCEMIA: Primary | ICD-10-CM

## 2021-01-26 DIAGNOSIS — E66.9 OBESITY (BMI 30-39.9): ICD-10-CM

## 2021-01-26 DIAGNOSIS — R35.0 URINARY FREQUENCY: ICD-10-CM

## 2021-01-26 DIAGNOSIS — E78.2 MIXED HYPERLIPIDEMIA: ICD-10-CM

## 2021-01-26 LAB — HBA1C MFR BLD: 13.8 %

## 2021-01-26 PROCEDURE — 2022F DILAT RTA XM EVC RTNOPTHY: CPT | Performed by: NURSE PRACTITIONER

## 2021-01-26 PROCEDURE — 1036F TOBACCO NON-USER: CPT | Performed by: NURSE PRACTITIONER

## 2021-01-26 PROCEDURE — 83036 HEMOGLOBIN GLYCOSYLATED A1C: CPT | Performed by: NURSE PRACTITIONER

## 2021-01-26 PROCEDURE — G8484 FLU IMMUNIZE NO ADMIN: HCPCS | Performed by: NURSE PRACTITIONER

## 2021-01-26 PROCEDURE — 3017F COLORECTAL CA SCREEN DOC REV: CPT | Performed by: NURSE PRACTITIONER

## 2021-01-26 PROCEDURE — 99215 OFFICE O/P EST HI 40 MIN: CPT | Performed by: NURSE PRACTITIONER

## 2021-01-26 PROCEDURE — G8427 DOCREV CUR MEDS BY ELIG CLIN: HCPCS | Performed by: NURSE PRACTITIONER

## 2021-01-26 PROCEDURE — 3046F HEMOGLOBIN A1C LEVEL >9.0%: CPT | Performed by: NURSE PRACTITIONER

## 2021-01-26 PROCEDURE — G8417 CALC BMI ABV UP PARAM F/U: HCPCS | Performed by: NURSE PRACTITIONER

## 2021-01-26 RX ORDER — DULAGLUTIDE 1.5 MG/.5ML
1.5 INJECTION, SOLUTION SUBCUTANEOUS WEEKLY
Qty: 4 PEN | Refills: 5 | Status: SHIPPED | OUTPATIENT
Start: 2021-01-26 | End: 2021-05-27 | Stop reason: SDUPTHER

## 2021-01-26 RX ORDER — INSULIN GLARGINE 100 [IU]/ML
20 INJECTION, SOLUTION SUBCUTANEOUS DAILY
Qty: 5 PEN | Refills: 3 | Status: SHIPPED | OUTPATIENT
Start: 2021-01-26 | End: 2021-05-27 | Stop reason: SDUPTHER

## 2021-01-26 RX ORDER — PEN NEEDLE, DIABETIC 31 GX5/16"
1 NEEDLE, DISPOSABLE MISCELLANEOUS DAILY
Qty: 100 EACH | Refills: 3 | Status: SHIPPED | OUTPATIENT
Start: 2021-01-26 | End: 2022-09-15

## 2021-01-26 ASSESSMENT — ENCOUNTER SYMPTOMS
COUGH: 0
NAUSEA: 0
SHORTNESS OF BREATH: 0
ABDOMINAL PAIN: 0

## 2021-01-26 ASSESSMENT — PATIENT HEALTH QUESTIONNAIRE - PHQ9
SUM OF ALL RESPONSES TO PHQ QUESTIONS 1-9: 0
1. LITTLE INTEREST OR PLEASURE IN DOING THINGS: 0
SUM OF ALL RESPONSES TO PHQ9 QUESTIONS 1 & 2: 0

## 2021-01-26 NOTE — PROGRESS NOTES
Subjective:      Patient ID: Spencer Quiroz is a 60 y.o. male. HPI: 6 month Follow Up    Chief Complaint   Patient presents with    6 Month Follow-Up    Diabetes    Dysuria     \"slight\" x 1 week. Denies urinary urgency, frequency and hematuria       Slight burning sensation. Split stream with urination. Denies urinary urgency, frequency and hematuria. Stream splints in 2. Patient Active Problem List   Diagnosis    Controlled type 2 diabetes mellitus without complication, without long-term current use of insulin ([address]    Essential hypertension    History of CVA (cerebrovascular accident)    Mixed hyperlipidemia    Obesity (BMI 30-39. 9)     Overall doing well. Denies CP, SOB or chest tightness. No smoking hx. COLONOSCOPY - 2016    BP Readings from Last 3 Encounters:   01/26/21 128/70   07/22/20 126/80   04/21/20 110/60       BP wnl. Losartan 50 mg. Wt Readings from Last 3 Encounters:   01/26/21 275 lb 4.8 oz (124.9 kg)   07/22/20 270 lb 12.8 oz (122.8 kg)   04/21/20 273 lb (123.8 kg)       He is on Metformin 1000 mg BID, Jardiance 25 mg and Actos 45 mg. Tolerating well. Body mass index is 36.32 kg/m². Did not get A1C last visit to switch him to another DM off the Bydureon. Lab Results   Component Value Date    LABA1C 13.8 01/26/2021    LABA1C 9.7 (H) 04/17/2020    LABA1C 6.1 10/18/2018     Lab Results   Component Value Date     07/02/2015       Lipitor 40 mg. Tolerating well.      No components found for: CHLPL  Lab Results   Component Value Date    TRIG 139 04/17/2020    TRIG 65 01/18/2018    TRIG 102 07/13/2017     Lab Results   Component Value Date    HDL 46 04/17/2020    HDL 57 01/18/2018    HDL 44 07/13/2017     Lab Results   Component Value Date    LDLCALC 185 04/17/2020    LDLCALC 62 01/18/2018    LDLCALC 158 07/13/2017     No results found for: LABVLDL      Chemistry        Component Value Date/Time     (L) 04/17/2020 1420    K 4.4 04/17/2020 1420    CL 94 (L) 04/17/2020 1420    CO2 26 04/17/2020 1420    BUN 10 04/17/2020 1420    CREATININE 1.0 04/17/2020 1420        Component Value Date/Time    CALCIUM 9.5 04/17/2020 1420    ALKPHOS 112 04/17/2020 1420    AST 33 04/17/2020 1420    ALT 36 04/17/2020 1420    BILITOT 0.6 04/17/2020 1420          Lab Results   Component Value Date    TSH 2.690 01/14/2017       Lab Results   Component Value Date    WBC 6.2 04/17/2020    HGB 17.4 04/17/2020    HCT 51.6 04/17/2020    MCV 92.1 04/17/2020     04/17/2020       Health Maintenance   Topic Date Due    HIV screen  04/26/1970    Shingles Vaccine (1 of 2) 04/26/2006    Diabetic retinal exam  04/02/2020    Flu vaccine (1) 09/01/2020    Diabetic microalbuminuria test  04/17/2021    Lipid screen  04/17/2021    Potassium monitoring  04/17/2021    Creatinine monitoring  04/17/2021    A1C test (Diabetic or Prediabetic)  04/26/2021    Diabetic foot exam  01/26/2022    Colon cancer screen colonoscopy  01/09/2027    DTaP/Tdap/Td vaccine (2 - Td) 01/11/2027    Pneumococcal 0-64 years Vaccine  Completed    Hepatitis C screen  Completed    Hepatitis A vaccine  Aged Out    Hib vaccine  Aged Out    Meningococcal (ACWY) vaccine  Aged [employer] History   Administered Date(s) Administered    Influenza, Quadv, IM, (6 mo and older Fluzone, Flulaval, Fluarix and 3 yrs and older Afluria) 01/11/2017, 10/18/2018    Pneumococcal Polysaccharide ([id number]) 01/11/2017    Tdap (Boostrix, Adacel) 01/11/2017       Review of Systems   Constitutional: Negative for chills and fever. HENT: Negative. Respiratory: Negative for cough and shortness of breath. Cardiovascular: Negative for chest pain. Gastrointestinal: Negative for abdominal pain and nausea. Genitourinary: Positive for dysuria and frequency. Skin: Negative for rash. Neurological: Negative for dizziness, light-headedness and headaches. Psychiatric/Behavioral: Negative.         Objective: Physical Exam  Constitutional:       General: He is not in acute distress. Eyes:      Pupils: Pupils are equal, round, and reactive to light. Neck:      Musculoskeletal: Normal range of motion and neck supple. Cardiovascular:      Rate and Rhythm: Normal rate and regular rhythm. Heart sounds: No murmur. Pulmonary:      Effort: Pulmonary effort is normal.      Breath sounds: Normal breath sounds. No wheezing. Abdominal:      General: Bowel sounds are normal. There is no distension. Palpations: Abdomen is soft. Tenderness: There is no abdominal tenderness. Musculoskeletal: Normal range of motion. General: No tenderness. Skin:     General: Skin is warm and dry. Findings: No rash. Assessment:       Diagnosis Orders   1. Hyperglycemia     2. Urinary frequency     3. Controlled type 2 diabetes mellitus without complication, without long-term current use of insulin ([facility])  POCT glycosylated hemoglobin (Hb A1C)     DIABETES FOOT EXAM    Dulaglutide (TRULICITY) 1.5 CA/8.9LW SOPN    insulin glargine (LANTUS SOLOSTAR) 100 UNIT/ML injection pen    Insulin Pen Needle (MEIJER PEN NEEDLES) 31G X 8 MM MISC   4. Essential hypertension     5. Mixed hyperlipidemia     6. Obesity (BMI 30-39. 9)             Plan:      Chronic conditions stable except DM  Labs reviewed  Refills as above   - Trulicity Weekly   - Lantus 20 units: increase 2 units until AM BS < 150  Urinary symptoms related to elevated sugars  Shingle vaccine discussed at HD  Healthy Lifestyles discussed  He is 3 months from Medicare - could get kicked off Ava Moris in 2 weeks to review BS    Marti Abdi received counseling on the following healthy behaviors: nutrition and exercise  Reviewed prior labs and health maintenance  Continue current medications, diet and exercise. Discussed use, benefit, and side effects of prescribed medications. Barriers to medication compliance addressed.    Patient given educational materials - see patient instructions  Was a self-tracking handout given in paper form or via [employer]? No:     Requested Prescriptions     Signed Prescriptions Disp Refills    Dulaglutide (TRULICITY) 1.5 EQ/3.9UQ SOPN 4 pen 5     Sig: Inject 1.5 mg into the skin once a week    insulin glargine (LANTUS SOLOSTAR) 100 UNIT/ML injection pen 5 pen 3     Sig: Inject 20 Units into the skin daily Increase 2 units until morning blood sugar less les 150    Insulin Pen Needle (MEIJER PEN NEEDLES) 31G X 8 MM MISC 100 each 3     Si each by Does not apply route daily       All patient questions answered. Patient voiced understanding. Quality Measures    Body mass index is 36.32 kg/m². Elevated. Weight control planned discussed Healthy diet and regular exercise. BP: 128/70 Blood pressure is normal. Treatment plan consists of No treatment change needed.     Lab Results   Component Value Date    LDLCALC 185 2020    LDLDIRECT 178 (H) 2015    (goal LDL reduction with dx if diabetes is 50% LDL reduction)      PHQ Scores 2021   PHQ2 Score 0 0 0 0 0   PHQ9 Score 0 0 0 0 0     Interpretation of Total Score Depression Severity: 1-4 = Minimal depression, 5-9 = Mild depression, 10-14 = Moderate depression, 15-19 = Moderately severe depression, 20-27 = Severe depression

## 2021-01-26 NOTE — PROGRESS NOTES
Chronic Disease Visit Information    BP Readings from Last 3 Encounters:   01/26/21 128/70   07/22/20 126/80   04/21/20 110/60          Hemoglobin A1C (%)   Date Value   04/17/2020 9.7 (H)   10/18/2018 6.1   04/19/2018 6.6     Microalbumin, Random Urine (mg/dL)   Date Value   04/17/2020 < 1.20     LDL Calculated (mg/dL)   Date Value   04/17/2020 185     HDL (mg/dL)   Date Value   04/17/2020 46     BUN (mg/dL)   Date Value   04/17/2020 10     CREATININE (mg/dL)   Date Value   04/17/2020 1.0     Glucose (mg/dL)   Date Value   04/17/2020 268 (H)   06/20/2018 129 (H)            Have you changed or started any medications since your last visit including any over-the-counter medicines, vitamins, or herbal medicines? no   Are you having any side effects from any of your medications? -  no  Have you stopped taking any of your medications? Is so, why? -  no    Have you seen any other physician or provider since your last visit? No  Have you had any other diagnostic tests since your last visit? No  Have you been seen in the emergency room and/or had an admission to a hospital since we last saw you? No  Have you had your annual diabetic retinal (eye) exam? No  Have you had your routine dental cleaning in the past 6 months? no    Have you activated your [employer] account? If not, what are your barriers?  No: declines     Patient Care Team:  NILE Cerda CNP as PCP - General (Certified Nurse Practitioner)  NILE Cerda CNP as PCP - [facility] Provider         Medical History Review  Past Medical, Family, and Social History reviewed and does contribute to the patient presenting condition    Health Maintenance   Topic Date Due    HIV screen  04/[birth year]/1971    Shingles Vaccine (1 of 2) 04/26/2006    Diabetic retinal exam  04/02/2020    A1C test (Diabetic or Prediabetic)  07/17/2020    Flu vaccine (1) 09/01/2020    Diabetic foot exam  10/23/2020    Diabetic microalbuminuria test  04/17/2021    Lipid screen 04/17/2021    Potassium monitoring  04/17/2021    Creatinine monitoring  04/17/2021    Colon cancer screen colonoscopy  01/09/2027    DTaP/Tdap/Td vaccine (2 - Td) 01/11/2027    Pneumococcal 0-64 years Vaccine  Completed    Hepatitis C screen  Completed    Hepatitis A vaccine  Aged Out    Hib vaccine  Aged Out    Meningococcal (ACWY) vaccine  Aged Out

## 2021-01-26 NOTE — PATIENT INSTRUCTIONS
You may receive a survey about your visit with us today. The feedback from our patients helps us identify what is working well and where the service to all patients can be enhanced. Thank you! Patient Education        Learning About Diabetes Food Guidelines  Your Care Instructions     Meal planning is important to manage diabetes. It helps keep your blood sugar at a target level (which you set with your doctor). You don't have to eat special foods. You can eat what your family eats, including sweets once in a while. But you do have to pay attention to how often you eat and how much you eat of certain foods. You may want to work with a dietitian or a certified diabetes educator (CDE) to help you plan meals and snacks. A dietitian or CDE can also help you lose weight if that is one of your goals. What should you know about eating carbs? Managing the amount of carbohydrate (carbs) you eat is an important part of healthy meals when you have diabetes. Carbohydrate is found in many foods. · Learn which foods have carbs. And learn the amounts of carbs in different foods. ? Bread, cereal, pasta, and rice have about 15 grams of carbs in a serving. A serving is 1 slice of bread (1 ounce), ½ cup of cooked cereal, or 1/3 cup of cooked pasta or rice. ? Fruits have 15 grams of carbs in a serving. A serving is 1 small fresh fruit, such as an apple or orange; ½ of a banana; ½ cup of cooked or canned fruit; ½ cup of fruit juice; 1 cup of melon or raspberries; or 2 tablespoons of dried fruit. ? Milk and no-sugar-added yogurt have 15 grams of carbs in a serving. A serving is 1 cup of milk or 2/3 cup of no-sugar-added yogurt. ? Starchy vegetables have 15 grams of carbs in a serving. A serving is ½ cup of mashed potatoes or sweet potato; 1 cup winter squash; ½ of a small baked potato; ½ cup of cooked beans; or ½ cup cooked corn or green peas.   · Learn how much carbs to eat each day and at each meal. A dietitian or CDE can teach you how to keep track of the amount of carbs you eat. This is called carbohydrate counting. · If you are not sure how to count carbohydrate grams, use the Plate Method to plan meals. It is a good, quick way to make sure that you have a balanced meal. It also helps you spread carbs throughout the day. ? Divide your plate by types of foods. Put non-starchy vegetables on half the plate, meat or other protein food on one-quarter of the plate, and a grain or starchy vegetable in the final quarter of the plate. To this you can add a small piece of fruit and 1 cup of milk or yogurt, depending on how many carbs you are supposed to eat at a meal.  · Try to eat about the same amount of carbs at each meal. Do not \"save up\" your daily allowance of carbs to eat at one meal.  · Proteins have very little or no carbs per serving. Examples of proteins are beef, chicken, turkey, fish, eggs, tofu, cheese, cottage cheese, and peanut butter. A serving size of meat is 3 ounces, which is about the size of a deck of cards. Examples of meat substitute serving sizes (equal to 1 ounce of meat) are 1/4 cup of cottage cheese, 1 egg, 1 tablespoon of peanut butter, and ½ cup of tofu. How can you eat out and still eat healthy? · Learn to estimate the serving sizes of foods that have carbohydrate. If you measure food at home, it will be easier to estimate the amount in a serving of restaurant food. · If the meal you order has too much carbohydrate (such as potatoes, corn, or baked beans), ask to have a low-carbohydrate food instead. Ask for a salad or green vegetables. · If you use insulin, check your blood sugar before and after eating out to help you plan how much to eat in the future. · If you eat more carbohydrate at a meal than you had planned, take a walk or do other exercise. This will help lower your blood sugar. What else should you know? · Limit saturated fat, such as the fat from meat and dairy products.  This is a healthy choice because people who have diabetes are at higher risk of heart disease. So choose lean cuts of meat and nonfat or low-fat dairy products. Use olive or canola oil instead of butter or shortening when cooking. · Don't skip meals. Your blood sugar may drop too low if you skip meals and take insulin or certain medicines for diabetes. · Check with your doctor before you drink alcohol. Alcohol can cause your blood sugar to drop too low. Alcohol can also cause a bad reaction if you take certain diabetes medicines. Follow-up care is a key part of your treatment and safety. Be sure to make and go to all appointments, and call your doctor if you are having problems. It's also a good idea to know your test results and keep a list of the medicines you take. Where can you learn more? Go to [email or web address] and sign in to your [facility] account. Enter W055 in the [place] box to learn more about \"Learning About Diabetes Food Guidelines. \"     If you do not have an account, please click on the \"Sign Up Now\" link. Current as of: December 20, 2019               Content Version: 12.6  © 9391-5307 [employer]. Care instructions adapted under license by [facility] ([facility]). If you have questions about a medical condition or this instruction, always ask your healthcare professional. [address] any warranty or liability for your use of this information.

## 2021-02-11 ENCOUNTER — OFFICE VISIT (OUTPATIENT)
Dept: FAMILY MEDICINE CLINIC | Age: 66
End: 2021-02-11
Payer: MEDICAID

## 2021-02-11 VITALS
TEMPERATURE: 97.1 F | DIASTOLIC BLOOD PRESSURE: 70 MMHG | BODY MASS INDEX: 36.66 KG/M2 | SYSTOLIC BLOOD PRESSURE: 138 MMHG | RESPIRATION RATE: 20 BRPM | HEART RATE: 84 BPM | WEIGHT: 277.9 LBS

## 2021-02-11 DIAGNOSIS — E66.9 OBESITY (BMI 30-39.9): ICD-10-CM

## 2021-02-11 DIAGNOSIS — Z86.73 HISTORY OF CVA (CEREBROVASCULAR ACCIDENT): ICD-10-CM

## 2021-02-11 DIAGNOSIS — E78.2 MIXED HYPERLIPIDEMIA: ICD-10-CM

## 2021-02-11 DIAGNOSIS — R73.9 HYPERGLYCEMIA: ICD-10-CM

## 2021-02-11 DIAGNOSIS — E11.9 CONTROLLED TYPE 2 DIABETES MELLITUS WITHOUT COMPLICATION, WITHOUT LONG-TERM CURRENT USE OF INSULIN (HCC): Primary | ICD-10-CM

## 2021-02-11 DIAGNOSIS — I10 ESSENTIAL HYPERTENSION: ICD-10-CM

## 2021-02-11 PROCEDURE — 2022F DILAT RTA XM EVC RTNOPTHY: CPT | Performed by: NURSE PRACTITIONER

## 2021-02-11 PROCEDURE — 99214 OFFICE O/P EST MOD 30 MIN: CPT | Performed by: NURSE PRACTITIONER

## 2021-02-11 PROCEDURE — 1036F TOBACCO NON-USER: CPT | Performed by: NURSE PRACTITIONER

## 2021-02-11 PROCEDURE — G8427 DOCREV CUR MEDS BY ELIG CLIN: HCPCS | Performed by: NURSE PRACTITIONER

## 2021-02-11 PROCEDURE — 3017F COLORECTAL CA SCREEN DOC REV: CPT | Performed by: NURSE PRACTITIONER

## 2021-02-11 PROCEDURE — G8417 CALC BMI ABV UP PARAM F/U: HCPCS | Performed by: NURSE PRACTITIONER

## 2021-02-11 PROCEDURE — 3046F HEMOGLOBIN A1C LEVEL >9.0%: CPT | Performed by: NURSE PRACTITIONER

## 2021-02-11 PROCEDURE — G8484 FLU IMMUNIZE NO ADMIN: HCPCS | Performed by: NURSE PRACTITIONER

## 2021-02-11 ASSESSMENT — ENCOUNTER SYMPTOMS
COUGH: 0
SHORTNESS OF BREATH: 0

## 2021-02-11 NOTE — PROGRESS NOTES
Chronic Disease Visit Information    BP Readings from Last 3 Encounters:   01/26/21 128/70   07/22/20 126/80   04/21/20 110/60          Hemoglobin A1C (%)   Date Value   01/26/2021 13.8   04/17/2020 9.7 (H)   10/18/2018 6.1     Microalbumin, Random Urine (mg/dL)   Date Value   04/17/2020 < 1.20     LDL Calculated (mg/dL)   Date Value   04/17/2020 185     HDL (mg/dL)   Date Value   04/17/2020 46     BUN (mg/dL)   Date Value   04/17/2020 10     CREATININE (mg/dL)   Date Value   04/17/2020 1.0     Glucose (mg/dL)   Date Value   04/17/2020 268 (H)   06/20/2018 129 (H)            Have you changed or started any medications since your last visit including any over-the-counter medicines, vitamins, or herbal medicines? no   Are you having any side effects from any of your medications? -  no  Have you stopped taking any of your medications? Is so, why? -  no    Have you seen any other physician or provider since your last visit? No  Have you had any other diagnostic tests since your last visit? No  Have you been seen in the emergency room and/or had an admission to a hospital since we last saw you? No  Have you had your annual diabetic retinal (eye) exam? No  Have you had your routine dental cleaning in the past 6 months? no    Have you activated your [facility] account? If not, what are your barriers?  No: pt choice     Patient Care Team:  NILE Garcia CNP as PCP - General (Certified Nurse Practitioner)  NILE Garcia CNP as PCP - [facility] Provider         Medical History Review  Past Medical, Family, and Social History reviewed and does contribute to the patient presenting condition    Health Maintenance   Topic Date Due    HIV screen  04/26/1971    Shingles Vaccine (1 of 2) 04/26/2006    Diabetic retinal exam  04/02/2020    Flu vaccine (1) 09/01/2020    Diabetic microalbuminuria test  04/17/2021    Lipid screen  04/17/2021    Potassium monitoring  04/17/2021  Creatinine monitoring  04/17/2021    A1C test (Diabetic or Prediabetic)  04/26/2021    Diabetic foot exam  01/26/2022    Colon cancer screen colonoscopy  01/09/2027    DTaP/Tdap/Td vaccine (2 - Td) 01/11/2027    Pneumococcal 0-64 years Vaccine  Completed    Hepatitis C screen  Completed    Hepatitis A vaccine  Aged Out    Hib vaccine  Aged Out    Meningococcal (ACWY) vaccine  Aged Out

## 2021-02-11 NOTE — PROGRESS NOTES
Subjective:      Patient ID: Abhishek Olivera is a 60 y.o. male. HPI: 6 month Follow Up    Chief Complaint   Patient presents with    2 Week Follow-Up     DM    Constipation     He is on Metformin 1000 mg BID, Jardiance 25 mg and Actos 45 mg. Last visit was stared on Lantus and Trulicity. He has not used the pens. Lab Results   Component Value Date    LABA1C 13.8 01/26/2021       Patient Active Problem List   Diagnosis    Controlled type 2 diabetes mellitus without complication, without long-term current use of insulin ([address]    Essential hypertension    History of CVA (cerebrovascular accident)    Mixed hyperlipidemia    Obesity (BMI 30-39. 9)       Overall doing well. Denies CP, SOB or chest tightness. No smoking hx. COLONOSCOPY - 2016    BP Readings from Last 3 Encounters:   02/11/21 138/70   01/26/21 128/70   07/22/20 126/80       BP wnl. Losartan 50 mg. Wt Readings from Last 3 Encounters:   02/11/21 277 lb 14.4 oz (126.1 kg)   01/26/21 275 lb 4.8 oz (124.9 kg)   07/22/20 270 lb 12.8 oz (122.8 kg)       He is on Metformin 1000 mg BID, Jardiance 25 mg and Actos 45 mg. Tolerating well. Body mass index is 36.66 kg/m². Did not get A1C last visit to switch him to another DM off the Bydureon. Lab Results   Component Value Date    LABA1C 13.8 01/26/2021    LABA1C 9.7 (H) 04/17/2020    LABA1C 6.1 10/18/2018     Lab Results   Component Value Date     07/02/2015       Lipitor 40 mg. Tolerating well.      No components found for: CHLPL  Lab Results   Component Value Date    TRIG 139 04/17/2020    TRIG 65 01/18/2018    TRIG 102 07/13/2017     Lab Results   Component Value Date    HDL 46 04/17/2020    HDL 57 01/18/2018    HDL 44 07/13/2017     Lab Results   Component Value Date    LDLCALC 185 04/17/2020    LDLCALC 62 01/18/2018    LDLCALC 158 07/13/2017     No results found for: LABVLDL      Chemistry        Component Value Date/Time     (L) 04/17/2020 1420    K 4.4 04/17/2020 1420    CL 94 (L) 04/17/2020 1420    CO2 26 04/17/2020 1420    BUN 10 04/17/2020 1420    CREATININE 1.0 04/17/2020 1420        Component Value Date/Time    CALCIUM 9.5 04/17/2020 1420    ALKPHOS 112 04/17/2020 1420    AST 33 04/17/2020 1420    ALT 36 04/17/2020 1420    BILITOT 0.6 04/17/2020 1420          Lab Results   Component Value Date    TSH 2.690 01/14/2017       Lab Results   Component Value Date    WBC 6.2 04/17/2020    HGB 17.4 04/17/2020    HCT 51.6 04/17/2020    MCV 92.1 04/17/2020     04/17/2020       Health Maintenance   Topic Date Due    HIV screen  04/26/1971    Shingles Vaccine (1 of 2) 04/26/2006    Diabetic retinal exam  04/02/2020    Flu vaccine (1) 09/01/2020    Diabetic microalbuminuria test  04/17/2021    Lipid screen  04/17/2021    Potassium monitoring  04/17/2021    Creatinine monitoring  04/17/2021    A1C test (Diabetic or Prediabetic)  04/26/2021    Diabetic foot exam  01/26/2022    Colon cancer screen colonoscopy  01/09/2027    DTaP/Tdap/Td vaccine (2 - Td) 01/11/2027    Pneumococcal 0-64 years Vaccine  Completed    Hepatitis C screen  Completed    Hepatitis A vaccine  Aged Out    Hib vaccine  Aged Out    Meningococcal (ACWY) vaccine  Aged [employer] History   Administered Date(s) Administered    Influenza, Quadv, IM, (6 mo and older Fluzone, Flulaval, Fluarix and 3 yrs and older Afluria) 01/11/2017, 10/18/2018    Pneumococcal Polysaccharide ([id number]) 01/11/2017    Tdap (Boostrix, Adacel) 01/11/2017       Review of Systems   Constitutional: Negative for chills. HENT: Negative. Respiratory: Negative for cough and shortness of breath. Cardiovascular: Negative for chest pain. Genitourinary: Positive for dysuria and frequency. Skin: Negative for rash. Neurological: Negative for dizziness, light-headedness and headaches. Psychiatric/Behavioral: Negative.         Objective:   Physical Exam  Constitutional:       General: He is not in acute distress. Eyes:      Pupils: Pupils are equal, round, and reactive to light. Neck:      Musculoskeletal: Normal range of motion and neck supple. Cardiovascular:      Rate and Rhythm: Normal rate and regular rhythm. Heart sounds: No murmur. Pulmonary:      Effort: Pulmonary effort is normal.      Breath sounds: Normal breath sounds. No wheezing. Abdominal:      General: Bowel sounds are normal. There is no distension. Palpations: Abdomen is soft. Tenderness: There is no abdominal tenderness. Musculoskeletal: Normal range of motion. General: No tenderness. Skin:     General: Skin is warm and dry. Findings: No rash. Assessment:       Diagnosis Orders   1. Controlled type 2 diabetes mellitus without complication, without long-term current use of insulin ([address]     2. Hyperglycemia     3. Essential hypertension     4. Mixed hyperlipidemia     5. Obesity (BMI 30-39.9)     6. History of CVA (cerebrovascular accident)             Plan:       Patient did not start Lantus or Trulicity  Instruction given on PENS and administration  Lantus 20 units: increase 2 units until AM BS < 150  Chronic conditions stable except DM  Constipation related to fluid imbalance due to hyperglycemia   - stool softener and when BS controlled bowels will return to normal  RTO in 2 weeks to review BS    Sona Amezcua received counseling on the following healthy behaviors: nutrition and exercise  Reviewed prior labs and health maintenance  Continue current medications, diet and exercise. Discussed use, benefit, and side effects of prescribed medications. Barriers to medication compliance addressed. Patient given educational materials - see patient instructions  Was a self-tracking handout given in paper form or via [employer]? No:     Requested Prescriptions      No prescriptions requested or ordered in this encounter       All patient questions answered. Patient voiced understanding.     Quality Measures    Body mass index is 36.66 kg/m². Elevated. Weight control planned discussed Healthy diet and regular exercise. BP: 138/70 Blood pressure is normal. Treatment plan consists of No treatment change needed.     Lab Results   Component Value Date    LDLCALC 185 04/17/2020    LDLDIRECT 178 (H) 07/02/2015    (goal LDL reduction with dx if diabetes is 50% LDL reduction)      PHQ Scores 1/26/2021 4/21/2020 4/24/2019 1/19/2018 1/11/2017   PHQ2 Score 0 0 0 0 0   PHQ9 Score 0 0 0 0 0     Interpretation of Total Score Depression Severity: 1-4 = Minimal depression, 5-9 = Mild depression, 10-14 = Moderate depression, 15-19 = Moderately severe depression, 20-27 = Severe depression

## 2021-02-11 NOTE — PATIENT INSTRUCTIONS
You may receive a survey regarding the care you received during your visit. Your input is valuable to us. We encourage you to complete and return your survey. We hope you will choose us in the future for your healthcare needs.     Lantus 20 units: increase 2 units until AM BS < 150

## 2021-02-26 ENCOUNTER — OFFICE VISIT (OUTPATIENT)
Dept: FAMILY MEDICINE CLINIC | Age: 66
End: 2021-02-26
Payer: MEDICAID

## 2021-02-26 VITALS
WEIGHT: 280.8 LBS | RESPIRATION RATE: 16 BRPM | SYSTOLIC BLOOD PRESSURE: 128 MMHG | HEART RATE: 76 BPM | TEMPERATURE: 96.9 F | DIASTOLIC BLOOD PRESSURE: 74 MMHG | BODY MASS INDEX: 37.05 KG/M2

## 2021-02-26 DIAGNOSIS — I10 ESSENTIAL HYPERTENSION: ICD-10-CM

## 2021-02-26 DIAGNOSIS — E11.9 CONTROLLED TYPE 2 DIABETES MELLITUS WITHOUT COMPLICATION, WITHOUT LONG-TERM CURRENT USE OF INSULIN (HCC): ICD-10-CM

## 2021-02-26 DIAGNOSIS — E78.2 MIXED HYPERLIPIDEMIA: ICD-10-CM

## 2021-02-26 DIAGNOSIS — E66.9 OBESITY (BMI 30-39.9): ICD-10-CM

## 2021-02-26 DIAGNOSIS — K04.7 DENTAL INFECTION: Primary | ICD-10-CM

## 2021-02-26 DIAGNOSIS — Z86.73 HISTORY OF CVA (CEREBROVASCULAR ACCIDENT): ICD-10-CM

## 2021-02-26 PROCEDURE — G8417 CALC BMI ABV UP PARAM F/U: HCPCS | Performed by: NURSE PRACTITIONER

## 2021-02-26 PROCEDURE — 2022F DILAT RTA XM EVC RTNOPTHY: CPT | Performed by: NURSE PRACTITIONER

## 2021-02-26 PROCEDURE — G8427 DOCREV CUR MEDS BY ELIG CLIN: HCPCS | Performed by: NURSE PRACTITIONER

## 2021-02-26 PROCEDURE — 3046F HEMOGLOBIN A1C LEVEL >9.0%: CPT | Performed by: NURSE PRACTITIONER

## 2021-02-26 PROCEDURE — 1036F TOBACCO NON-USER: CPT | Performed by: NURSE PRACTITIONER

## 2021-02-26 PROCEDURE — G8484 FLU IMMUNIZE NO ADMIN: HCPCS | Performed by: NURSE PRACTITIONER

## 2021-02-26 PROCEDURE — 3017F COLORECTAL CA SCREEN DOC REV: CPT | Performed by: NURSE PRACTITIONER

## 2021-02-26 PROCEDURE — 99214 OFFICE O/P EST MOD 30 MIN: CPT | Performed by: NURSE PRACTITIONER

## 2021-02-26 RX ORDER — AMOXICILLIN 875 MG/1
875 TABLET, COATED ORAL 2 TIMES DAILY
Qty: 20 TABLET | Refills: 0 | Status: SHIPPED | OUTPATIENT
Start: 2021-02-26 | End: 2021-03-08

## 2021-02-26 ASSESSMENT — ENCOUNTER SYMPTOMS
COUGH: 0
SHORTNESS OF BREATH: 0

## 2021-02-26 NOTE — PROGRESS NOTES
Subjective:      Patient ID: Eliazar Myers is a 60 y.o. male. HPI: 6 month Follow Up    Chief Complaint   Patient presents with    2 Week Follow-Up    Diabetes     He is on Metformin 1000 mg BID, Jardiance 25 mg and Actos 45 mg. Last visit was stared on Lantus and Trulicity. He has increased his Lantus up to 24 units HS. Morning sugars running high 100s and low 200s. Has not starting Trulicity. Lab Results   Component Value Date    LABA1C 13.8 01/26/2021       Tooth broke off last night. Sees DENTAL on Monday. No gum pain or swelling. No drainage. Patient Active Problem List   Diagnosis    Controlled type 2 diabetes mellitus without complication, without long-term current use of insulin ([address]    Essential hypertension    History of CVA (cerebrovascular accident)    Mixed hyperlipidemia    Obesity (BMI 30-39. 9)       Overall doing well. Denies CP, SOB or chest tightness. No smoking hx. COLONOSCOPY - 2016    BP Readings from Last 3 Encounters:   02/26/21 128/74   02/11/21 138/70   01/26/21 128/70       BP wnl. Losartan 50 mg. Wt Readings from Last 3 Encounters:   02/26/21 280 lb 12.8 oz (127.4 kg)   02/11/21 277 lb 14.4 oz (126.1 kg)   01/26/21 275 lb 4.8 oz (124.9 kg)       Lab Results   Component Value Date    LABA1C 13.8 01/26/2021    LABA1C 9.7 (H) 04/17/2020    LABA1C 6.1 10/18/2018     Lab Results   Component Value Date     07/02/2015       Lipitor 40 mg. Tolerating well.      No components found for: CHLPL  Lab Results   Component Value Date    TRIG 139 04/17/2020    TRIG 65 01/18/2018    TRIG 102 07/13/2017     Lab Results   Component Value Date    HDL 46 04/17/2020    HDL 57 01/18/2018    HDL 44 07/13/2017     Lab Results   Component Value Date    LDLCALC 185 04/17/2020    LDLCALC 62 01/18/2018    LDLCALC 158 07/13/2017     No results found for: LABVLDL      Chemistry        Component Value Date/Time     (L) 04/17/2020 1420    K 4.4 04/17/2020 1420    CL 94 (L) 04/17/2020 1420    CO2 26 04/17/2020 1420    BUN 10 04/17/2020 1420    CREATININE 1.0 04/17/2020 1420        Component Value Date/Time    CALCIUM 9.5 04/17/2020 1420    ALKPHOS 112 04/17/2020 1420    AST 33 04/17/2020 1420    ALT 36 04/17/2020 1420    BILITOT 0.6 04/17/2020 1420          Lab Results   Component Value Date    TSH 2.690 01/14/2017       Lab Results   Component Value Date    WBC 6.2 04/17/2020    HGB 17.4 04/17/2020    HCT 51.6 04/17/2020    MCV 92.1 04/17/2020     04/17/2020       Health Maintenance   Topic Date Due    HIV screen  04/26/1970    Shingles Vaccine (1 of 2) 04/26/2006    Diabetic retinal exam  04/02/2020    Flu vaccine (1) 09/01/2020    Diabetic microalbuminuria test  04/17/2021    Lipid screen  04/17/2021    Potassium monitoring  04/17/2021    Creatinine monitoring  04/17/2021    A1C test (Diabetic or Prediabetic)  04/26/2021    Diabetic foot exam  01/26/2022    Colon cancer screen colonoscopy  01/09/2027    DTaP/Tdap/Td vaccine (2 - Td) 01/11/2027    Pneumococcal 0-64 years Vaccine  Completed    Hepatitis C screen  Completed    Hepatitis A vaccine  Aged Out    Hib vaccine  Aged Out    Meningococcal (ACWY) vaccine  Aged [employer] History   Administered Date(s) Administered    Influenza, Quadv, IM, (6 mo and older Fluzone, Flulaval, Fluarix and 3 yrs and older Afluria) 01/11/2017, 10/18/2018    Pneumococcal Polysaccharide ([id number]) 01/11/2017    Tdap (Boostrix, Adacel) 01/11/2017       Review of Systems   Constitutional: Negative for chills. HENT: Negative. Respiratory: Negative for cough and shortness of breath. Genitourinary: Negative for dysuria and frequency. Skin: Negative for rash. Neurological: Negative for light-headedness. Psychiatric/Behavioral: Negative. Objective:   Physical Exam  Constitutional:       General: He is not in acute distress. Eyes:      Pupils: Pupils are equal, round, and reactive to light. Neck:      Musculoskeletal: Normal range of motion and neck supple. Cardiovascular:      Rate and Rhythm: Normal rate and regular rhythm. Heart sounds: No murmur. Pulmonary:      Effort: Pulmonary effort is normal.      Breath sounds: Normal breath sounds. No wheezing. Abdominal:      General: Bowel sounds are normal. There is no distension. Palpations: Abdomen is soft. Tenderness: There is no abdominal tenderness. Musculoskeletal: Normal range of motion. General: No tenderness. Skin:     General: Skin is warm and dry. Findings: No rash. Assessment:       Diagnosis Orders   1. Dental infection  amoxicillin (AMOXIL) 875 MG tablet   2. Controlled type 2 diabetes mellitus without complication, without long-term current use of insulin (HCC)  Hemoglobin A1C   3. Mixed hyperlipidemia     4. Essential hypertension     5. Obesity (BMI 30-39.9)     6. History of CVA (cerebrovascular accident)             Plan:       First dose Trulicity given in office - instructed on use  BS reviewed  Increase Lantus 30 units HS  Lantus 20 units: increase 2 units until AM BS < 150  Chronic conditions stable except DM  Drop off BS in 2-4 weeks  Labs in 3 months  Hold ATB for dental infection  RTO in 3 months    Raymond Ma received counseling on the following healthy behaviors: nutrition and exercise  Reviewed prior labs and health maintenance  Continue current medications, diet and exercise. Discussed use, benefit, and side effects of prescribed medications. Barriers to medication compliance addressed. Patient given educational materials - see patient instructions  Was a self-tracking handout given in paper form or via [email or web address]? No:     Requested Prescriptions     Signed Prescriptions Disp Refills    amoxicillin (AMOXIL) 875 MG tablet 20 tablet 0     Sig: Take 1 tablet by mouth 2 times daily for 10 days       All patient questions answered. Patient voiced understanding.     Quality Measures Body mass index is 37.05 kg/m². Elevated. Weight control planned discussed Healthy diet and regular exercise. BP: 128/74 Blood pressure is normal. Treatment plan consists of No treatment change needed.     Lab Results   Component Value Date    LDLCALC 185 04/17/2020    LDLDIRECT 178 (H) 07/02/2015    (goal LDL reduction with dx if diabetes is 50% LDL reduction)      PHQ Scores 1/26/2021 4/21/2020 4/24/2019 1/19/2018 1/11/2017   PHQ2 Score 0 0 0 0 0   PHQ9 Score 0 0 0 0 0     Interpretation of Total Score Depression Severity: 1-4 = Minimal depression, 5-9 = Mild depression, 10-14 = Moderate depression, 15-19 = Moderately severe depression, 20-27 = Severe depression

## 2021-04-28 DIAGNOSIS — E11.9 CONTROLLED TYPE 2 DIABETES MELLITUS WITHOUT COMPLICATION, WITHOUT LONG-TERM CURRENT USE OF INSULIN (HCC): ICD-10-CM

## 2021-05-18 ENCOUNTER — TELEPHONE (OUTPATIENT)
Dept: FAMILY MEDICINE CLINIC | Age: 66
End: 2021-05-18

## 2021-05-18 NOTE — TELEPHONE ENCOUNTER
L/M for pt to call office. Called patient for pre visit planning. Pt has appt on 5/24/2021 at 10:45am.  Please remind pt of appt date and time and check to see if they had their lab work done.

## 2021-05-27 ENCOUNTER — HOSPITAL ENCOUNTER (OUTPATIENT)
Age: 66
Discharge: HOME | End: 2021-05-27
Payer: MEDICARE

## 2021-05-27 ENCOUNTER — OFFICE VISIT (OUTPATIENT)
Dept: FAMILY MEDICINE CLINIC | Age: 66
End: 2021-05-27
Payer: MEDICARE

## 2021-05-27 VITALS
SYSTOLIC BLOOD PRESSURE: 134 MMHG | HEART RATE: 76 BPM | BODY MASS INDEX: 37.34 KG/M2 | RESPIRATION RATE: 16 BRPM | DIASTOLIC BLOOD PRESSURE: 70 MMHG | HEIGHT: 73 IN | WEIGHT: 281.7 LBS

## 2021-05-27 DIAGNOSIS — E11.9 CONTROLLED TYPE 2 DIABETES MELLITUS WITHOUT COMPLICATION, WITHOUT LONG-TERM CURRENT USE OF INSULIN (HCC): Primary | ICD-10-CM

## 2021-05-27 DIAGNOSIS — E78.2 MIXED HYPERLIPIDEMIA: ICD-10-CM

## 2021-05-27 DIAGNOSIS — Z86.73 HISTORY OF CVA (CEREBROVASCULAR ACCIDENT): ICD-10-CM

## 2021-05-27 DIAGNOSIS — E11.9 CONTROLLED TYPE 2 DIABETES MELLITUS WITHOUT COMPLICATION, WITHOUT LONG-TERM CURRENT USE OF INSULIN (HCC): ICD-10-CM

## 2021-05-27 DIAGNOSIS — Z12.5 SCREENING PSA (PROSTATE SPECIFIC ANTIGEN): ICD-10-CM

## 2021-05-27 DIAGNOSIS — E66.9 OBESITY (BMI 30-39.9): ICD-10-CM

## 2021-05-27 DIAGNOSIS — R42 VERTIGO: ICD-10-CM

## 2021-05-27 DIAGNOSIS — I10 ESSENTIAL HYPERTENSION: ICD-10-CM

## 2021-05-27 LAB
ALBUMIN SERPL-MCNC: 3.8 G/DL (ref 3.5–5.1)
ALP BLD-CCNC: 111 U/L (ref 38–126)
ALT SERPL-CCNC: 22 U/L (ref 11–66)
ANION GAP SERPL CALCULATED.3IONS-SCNC: 10 MEQ/L (ref 8–16)
AST SERPL-CCNC: 20 U/L (ref 5–40)
AVERAGE GLUCOSE: 279 MG/DL (ref 70–126)
BILIRUB SERPL-MCNC: 0.2 MG/DL (ref 0.3–1.2)
BUN BLDV-MCNC: 10 MG/DL (ref 7–22)
CALCIUM SERPL-MCNC: 9.2 MG/DL (ref 8.5–10.5)
CHLORIDE BLD-SCNC: 100 MEQ/L (ref 98–111)
CHOLESTEROL, TOTAL: 236 MG/DL (ref 100–199)
CO2: 30 MEQ/L (ref 23–33)
CREAT SERPL-MCNC: 0.9 MG/DL (ref 0.4–1.2)
CREATININE, URINE: 109.5 MG/DL
ERYTHROCYTE [DISTWIDTH] IN BLOOD BY AUTOMATED COUNT: 11.9 % (ref 11.5–14.5)
ERYTHROCYTE [DISTWIDTH] IN BLOOD BY AUTOMATED COUNT: 40.5 FL (ref 35–45)
GFR SERPL CREATININE-BSD FRML MDRD: 85 ML/MIN/1.73M2
GLUCOSE BLD-MCNC: 283 MG/DL (ref 70–108)
HBA1C MFR BLD: 11.3 % (ref 4.4–6.4)
HCT VFR BLD CALC: 50 % (ref 42–52)
HDLC SERPL-MCNC: 42 MG/DL
HEMOGLOBIN: 16.4 GM/DL (ref 14–18)
LDL CHOLESTEROL CALCULATED: 166 MG/DL
MCH RBC QN AUTO: 30.5 PG (ref 26–33)
MCHC RBC AUTO-ENTMCNC: 32.8 GM/DL (ref 32.2–35.5)
MCV RBC AUTO: 93.1 FL (ref 80–94)
MICROALBUMIN UR-MCNC: < 1.2 MG/DL
MICROALBUMIN/CREAT UR-RTO: 11 MG/G (ref 0–30)
PLATELET # BLD: 308 THOU/MM3 (ref 130–400)
PMV BLD AUTO: 10.3 FL (ref 9.4–12.4)
POTASSIUM SERPL-SCNC: 4.8 MEQ/L (ref 3.5–5.2)
PROSTATE SPECIFIC ANTIGEN: 0.46 NG/ML (ref 0–1)
RBC # BLD: 5.37 MILL/MM3 (ref 4.7–6.1)
SODIUM BLD-SCNC: 140 MEQ/L (ref 135–145)
TOTAL PROTEIN: 7.3 G/DL (ref 6.1–8)
TRIGL SERPL-MCNC: 142 MG/DL (ref 0–199)
WBC # BLD: 6 THOU/MM3 (ref 4.8–10.8)

## 2021-05-27 PROCEDURE — 80053 COMPREHEN METABOLIC PANEL: CPT

## 2021-05-27 PROCEDURE — 85027 COMPLETE CBC AUTOMATED: CPT

## 2021-05-27 PROCEDURE — 4040F PNEUMOC VAC/ADMIN/RCVD: CPT | Performed by: NURSE PRACTITIONER

## 2021-05-27 PROCEDURE — 1123F ACP DISCUSS/DSCN MKR DOCD: CPT | Performed by: NURSE PRACTITIONER

## 2021-05-27 PROCEDURE — 36415 COLL VENOUS BLD VENIPUNCTURE: CPT

## 2021-05-27 PROCEDURE — G8417 CALC BMI ABV UP PARAM F/U: HCPCS | Performed by: NURSE PRACTITIONER

## 2021-05-27 PROCEDURE — 3046F HEMOGLOBIN A1C LEVEL >9.0%: CPT | Performed by: NURSE PRACTITIONER

## 2021-05-27 PROCEDURE — 3017F COLORECTAL CA SCREEN DOC REV: CPT | Performed by: NURSE PRACTITIONER

## 2021-05-27 PROCEDURE — G0103 PSA SCREENING: HCPCS

## 2021-05-27 PROCEDURE — 99214 OFFICE O/P EST MOD 30 MIN: CPT | Performed by: NURSE PRACTITIONER

## 2021-05-27 PROCEDURE — 83036 HEMOGLOBIN GLYCOSYLATED A1C: CPT

## 2021-05-27 PROCEDURE — 82043 UR ALBUMIN QUANTITATIVE: CPT

## 2021-05-27 PROCEDURE — 2022F DILAT RTA XM EVC RTNOPTHY: CPT | Performed by: NURSE PRACTITIONER

## 2021-05-27 PROCEDURE — G8427 DOCREV CUR MEDS BY ELIG CLIN: HCPCS | Performed by: NURSE PRACTITIONER

## 2021-05-27 PROCEDURE — 80061 LIPID PANEL: CPT

## 2021-05-27 PROCEDURE — 1036F TOBACCO NON-USER: CPT | Performed by: NURSE PRACTITIONER

## 2021-05-27 RX ORDER — PIOGLITAZONEHYDROCHLORIDE 45 MG/1
TABLET ORAL
Qty: 30 TABLET | Refills: 11 | Status: SHIPPED | OUTPATIENT
Start: 2021-05-27 | End: 2021-12-13 | Stop reason: SDUPTHER

## 2021-05-27 RX ORDER — INSULIN GLARGINE 100 [IU]/ML
30 INJECTION, SOLUTION SUBCUTANEOUS 2 TIMES DAILY
Qty: 15 ML | Refills: 1 | Status: SHIPPED | OUTPATIENT
Start: 2021-05-27 | End: 2022-03-24

## 2021-05-27 RX ORDER — DULAGLUTIDE 1.5 MG/.5ML
1.5 INJECTION, SOLUTION SUBCUTANEOUS WEEKLY
Qty: 4 PEN | Refills: 5 | Status: SHIPPED | OUTPATIENT
Start: 2021-05-27 | End: 2021-05-28

## 2021-05-27 RX ORDER — EMPAGLIFLOZIN 25 MG/1
25 TABLET, FILM COATED ORAL DAILY
Qty: 30 TABLET | Refills: 11 | Status: SHIPPED | OUTPATIENT
Start: 2021-05-27 | End: 2021-12-13 | Stop reason: SDUPTHER

## 2021-05-27 ASSESSMENT — ENCOUNTER SYMPTOMS: SHORTNESS OF BREATH: 0

## 2021-05-27 NOTE — PROGRESS NOTES
Component Value Date/Time    CALCIUM 9.5 04/17/2020 1420    ALKPHOS 112 04/17/2020 1420    AST 33 04/17/2020 1420    ALT 36 04/17/2020 1420    BILITOT 0.6 04/17/2020 1420          Lab Results   Component Value Date    TSH 2.690 01/14/2017       Lab Results   Component Value Date    WBC 6.2 04/17/2020    HGB 17.4 04/17/2020    HCT 51.6 04/17/2020    MCV 92.1 04/17/2020     04/17/2020       Health Maintenance   Topic Date Due    COVID-19 Vaccine (1) Never done    HIV screen  Never done    Shingles Vaccine (1 of 2) Never done    Diabetic retinal exam  04/02/2020    Diabetic microalbuminuria test  04/17/2021    Lipid screen  04/17/2021    Potassium monitoring  04/17/2021    Creatinine monitoring  04/17/2021    A1C test (Diabetic or Prediabetic)  04/26/2021    Flu vaccine (Season Ended) 09/01/2021    Pneumococcal 65+ years Vaccine (1 of 1 - PPSV23) 01/11/2022    Diabetic foot exam  01/26/2022    Colon cancer screen colonoscopy  01/09/2027    DTaP/Tdap/Td vaccine (2 - Td) 01/11/2027    Hepatitis C screen  Completed    Hepatitis A vaccine  Aged Out    Hib vaccine  Aged Out    Meningococcal (ACWY) vaccine  Aged [employer] History   Administered Date(s) Administered    Influenza, Quadv, IM, (6 mo and older Fluzone, Flulaval, Fluarix and 3 yrs and older Afluria) 01/11/2017, 10/18/2018    Pneumococcal Polysaccharide ([id number]) 01/11/2017    Tdap (Boostrix, Adacel) 01/11/2017       Review of Systems   HENT: Negative. Respiratory: Negative for shortness of breath. Genitourinary: Negative for dysuria and frequency. Neurological: Negative for light-headedness. Psychiatric/Behavioral: Negative. Objective:   Physical Exam  Constitutional:       General: He is not in acute distress. Eyes:      Pupils: Pupils are equal, round, and reactive to light. Cardiovascular:      Rate and Rhythm: Normal rate and regular rhythm. Heart sounds: No murmur heard.      Pulmonary: Effort: Pulmonary effort is normal.      Breath sounds: Normal breath sounds. No wheezing. Abdominal:      General: Bowel sounds are normal. There is no distension. Palpations: Abdomen is soft. Tenderness: There is no abdominal tenderness. Musculoskeletal:         General: No tenderness. Normal range of motion. Cervical back: Normal range of motion and neck supple. Skin:     General: Skin is warm and dry. Findings: No rash. Assessment:       Diagnosis Orders   1. Controlled type 2 diabetes mellitus without complication, without long-term current use of insulin (HCC)  Dulaglutide (TRULICITY) 1.5 SF/9.6YD SOPN    insulin glargine (LANTUS SOLOSTAR) 100 UNIT/ML injection pen    pioglitazone (ACTOS) 45 MG tablet    Hemoglobin A1C    MICROALBUMIN, RANDOM URINE (W/O CREATININE)    empagliflozin (JARDIANCE) 25 MG tablet   2. Mixed hyperlipidemia  CBC    Lipid Panel    TSH with Reflex   3. Essential hypertension  Comprehensive Metabolic Panel   4. Obesity (BMI 30-39.9)     5. Screening PSA (prostate specific antigen)  PSA screening   6. History of CVA (cerebrovascular accident)             Plan:       Chronic conditions stable  Labs as above to monitor  Compliance stressed! Refills as above  Diet and exercise  Vertigo Exercises given  RTO in 3 months - A1C in office        Kym Whitlockstella received counseling on the following healthy behaviors: nutrition and exercise  Reviewed prior labs and health maintenance  Continue current medications, diet and exercise. Discussed use, benefit, and side effects of prescribed medications. Barriers to medication compliance addressed. Patient given educational materials - see patient instructions  Was a self-tracking handout given in paper form or via [employer]?  No:     Requested Prescriptions     Signed Prescriptions Disp Refills    Dulaglutide (TRULICITY) 1.5 KQ/9.9DQ SOPN 4 pen 5     Sig: Inject 1.5 mg into the skin once a week    insulin glargine (LANTUS SOLOSTAR) 100 UNIT/ML injection pen 15 mL 1     Sig: Inject 30 Units into the skin 2 times daily    pioglitazone (ACTOS) 45 MG tablet 30 tablet 11     Sig: take 1 tablet by mouth once daily    empagliflozin (JARDIANCE) 25 MG tablet 30 tablet 11     Sig: Take 25 mg by mouth daily       All patient questions answered. Patient voiced understanding. Quality Measures    Body mass index is 37.17 kg/m². Elevated. Weight control planned discussed Healthy diet and regular exercise. BP: 134/70 Blood pressure is normal. Treatment plan consists of No treatment change needed.     Lab Results   Component Value Date    LDLCALC 185 04/17/2020    LDLDIRECT 178 (H) 07/02/2015    (goal LDL reduction with dx if diabetes is 50% LDL reduction)      PHQ Scores 1/26/2021 4/21/2020 4/24/2019 1/19/2018 1/11/2017   PHQ2 Score 0 0 0 0 0   PHQ9 Score 0 0 0 0 0     Interpretation of Total Score Depression Severity: 1-4 = Minimal depression, 5-9 = Mild depression, 10-14 = Moderate depression, 15-19 = Moderately severe depression, 20-27 = Severe depression

## 2021-05-27 NOTE — PROGRESS NOTES
04/17/2021    Potassium monitoring  04/17/2021    Creatinine monitoring  04/17/2021    A1C test (Diabetic or Prediabetic)  04/26/2021    Flu vaccine (Season Ended) 09/01/2021    Pneumococcal 65+ years Vaccine (1 of 1 - PPSV23) 01/11/2022    Diabetic foot exam  01/26/2022    Colon cancer screen colonoscopy  01/09/2027    DTaP/Tdap/Td vaccine (2 - Td) 01/11/2027    Hepatitis C screen  Completed    Hepatitis A vaccine  Aged Out    Hib vaccine  Aged Out    Meningococcal (ACWY) vaccine  Aged Out

## 2021-05-28 DIAGNOSIS — E11.9 CONTROLLED TYPE 2 DIABETES MELLITUS WITHOUT COMPLICATION, WITHOUT LONG-TERM CURRENT USE OF INSULIN (HCC): Primary | ICD-10-CM

## 2021-05-28 RX ORDER — DULAGLUTIDE 3 MG/.5ML
3 INJECTION, SOLUTION SUBCUTANEOUS WEEKLY
Qty: 4 PEN | Refills: 5 | Status: SHIPPED | OUTPATIENT
Start: 2021-05-28 | End: 2022-09-14

## 2021-08-26 ENCOUNTER — OFFICE VISIT (OUTPATIENT)
Dept: FAMILY MEDICINE CLINIC | Age: 66
End: 2021-08-26
Payer: MEDICARE

## 2021-08-26 VITALS
SYSTOLIC BLOOD PRESSURE: 138 MMHG | HEART RATE: 84 BPM | WEIGHT: 276 LBS | HEIGHT: 73 IN | RESPIRATION RATE: 20 BRPM | DIASTOLIC BLOOD PRESSURE: 78 MMHG | BODY MASS INDEX: 36.58 KG/M2

## 2021-08-26 DIAGNOSIS — E78.2 MIXED HYPERLIPIDEMIA: ICD-10-CM

## 2021-08-26 DIAGNOSIS — E66.9 OBESITY (BMI 30-39.9): ICD-10-CM

## 2021-08-26 DIAGNOSIS — E66.01 SEVERE OBESITY (BMI 35.0-35.9 WITH COMORBIDITY) (HCC): ICD-10-CM

## 2021-08-26 DIAGNOSIS — Z86.73 HISTORY OF CVA (CEREBROVASCULAR ACCIDENT): ICD-10-CM

## 2021-08-26 DIAGNOSIS — E11.9 CONTROLLED TYPE 2 DIABETES MELLITUS WITHOUT COMPLICATION, WITHOUT LONG-TERM CURRENT USE OF INSULIN (HCC): ICD-10-CM

## 2021-08-26 DIAGNOSIS — Z00.00 ROUTINE GENERAL MEDICAL EXAMINATION AT A HEALTH CARE FACILITY: Primary | ICD-10-CM

## 2021-08-26 DIAGNOSIS — I10 ESSENTIAL HYPERTENSION: ICD-10-CM

## 2021-08-26 LAB — HBA1C MFR BLD: 11 %

## 2021-08-26 PROCEDURE — 4040F PNEUMOC VAC/ADMIN/RCVD: CPT | Performed by: NURSE PRACTITIONER

## 2021-08-26 PROCEDURE — G0402 INITIAL PREVENTIVE EXAM: HCPCS | Performed by: NURSE PRACTITIONER

## 2021-08-26 PROCEDURE — G8427 DOCREV CUR MEDS BY ELIG CLIN: HCPCS | Performed by: NURSE PRACTITIONER

## 2021-08-26 PROCEDURE — 1036F TOBACCO NON-USER: CPT | Performed by: NURSE PRACTITIONER

## 2021-08-26 PROCEDURE — 2022F DILAT RTA XM EVC RTNOPTHY: CPT | Performed by: NURSE PRACTITIONER

## 2021-08-26 PROCEDURE — 83036 HEMOGLOBIN GLYCOSYLATED A1C: CPT | Performed by: NURSE PRACTITIONER

## 2021-08-26 PROCEDURE — 3046F HEMOGLOBIN A1C LEVEL >9.0%: CPT | Performed by: NURSE PRACTITIONER

## 2021-08-26 PROCEDURE — 99214 OFFICE O/P EST MOD 30 MIN: CPT | Performed by: NURSE PRACTITIONER

## 2021-08-26 PROCEDURE — G8417 CALC BMI ABV UP PARAM F/U: HCPCS | Performed by: NURSE PRACTITIONER

## 2021-08-26 PROCEDURE — 3017F COLORECTAL CA SCREEN DOC REV: CPT | Performed by: NURSE PRACTITIONER

## 2021-08-26 PROCEDURE — 1123F ACP DISCUSS/DSCN MKR DOCD: CPT | Performed by: NURSE PRACTITIONER

## 2021-08-26 RX ORDER — INSULIN ASPART 100 [IU]/ML
10 INJECTION, SOLUTION INTRAVENOUS; SUBCUTANEOUS
Qty: 5 PEN | Refills: 3 | Status: SHIPPED | OUTPATIENT
Start: 2021-08-26 | End: 2021-12-13

## 2021-08-26 RX ORDER — LOSARTAN POTASSIUM 50 MG/1
TABLET ORAL
Qty: 30 TABLET | Refills: 11 | Status: SHIPPED | OUTPATIENT
Start: 2021-08-26 | End: 2022-09-15 | Stop reason: SDUPTHER

## 2021-08-26 ASSESSMENT — PATIENT HEALTH QUESTIONNAIRE - PHQ9
1. LITTLE INTEREST OR PLEASURE IN DOING THINGS: 0
SUM OF ALL RESPONSES TO PHQ QUESTIONS 1-9: 0
2. FEELING DOWN, DEPRESSED OR HOPELESS: 0
SUM OF ALL RESPONSES TO PHQ QUESTIONS 1-9: 0
SUM OF ALL RESPONSES TO PHQ QUESTIONS 1-9: 0
SUM OF ALL RESPONSES TO PHQ9 QUESTIONS 1 & 2: 0

## 2021-08-26 ASSESSMENT — LIFESTYLE VARIABLES
HOW OFTEN DO YOU HAVE SIX OR MORE DRINKS ON ONE OCCASION: 0
AUDIT TOTAL SCORE: 2
HOW MANY STANDARD DRINKS CONTAINING ALCOHOL DO YOU HAVE ON A TYPICAL DAY: 0
HAS A RELATIVE, FRIEND, DOCTOR, OR ANOTHER HEALTH PROFESSIONAL EXPRESSED CONCERN ABOUT YOUR DRINKING OR SUGGESTED YOU CUT DOWN: 0
HOW OFTEN DURING THE LAST YEAR HAVE YOU FAILED TO DO WHAT WAS NORMALLY EXPECTED FROM YOU BECAUSE OF DRINKING: 0
HOW OFTEN DURING THE LAST YEAR HAVE YOU FOUND THAT YOU WERE NOT ABLE TO STOP DRINKING ONCE YOU HAD STARTED: 0
HAVE YOU OR SOMEONE ELSE BEEN INJURED AS A RESULT OF YOUR DRINKING: 0
AUDIT-C TOTAL SCORE: 2
HOW OFTEN DURING THE LAST YEAR HAVE YOU NEEDED AN ALCOHOLIC DRINK FIRST THING IN THE MORNING TO GET YOURSELF GOING AFTER A NIGHT OF HEAVY DRINKING: 0
HOW OFTEN DURING THE LAST YEAR HAVE YOU HAD A FEELING OF GUILT OR REMORSE AFTER DRINKING: 0
HOW OFTEN DO YOU HAVE A DRINK CONTAINING ALCOHOL: 2
HOW OFTEN DURING THE LAST YEAR HAVE YOU BEEN UNABLE TO REMEMBER WHAT HAPPENED THE NIGHT BEFORE BECAUSE YOU HAD BEEN DRINKING: 0

## 2021-08-26 ASSESSMENT — ENCOUNTER SYMPTOMS: SHORTNESS OF BREATH: 0

## 2021-08-26 NOTE — PATIENT INSTRUCTIONS
You may receive a survey regarding the care you received during your visit. Your input is valuable to us. We encourage you to complete and return your survey. We hope you will choose us in the future for your healthcare needs. Personalized Preventive Plan for Caleb Sherwood - 8/26/2021  Medicare offers a range of preventive health benefits. Some of the tests and screenings are paid in full while other may be subject to a deductible, co-insurance, and/or copay. Some of these benefits include a comprehensive review of your medical history including lifestyle, illnesses that may run in your family, and various assessments and screenings as appropriate. After reviewing your medical record and screening and assessments performed today your provider may have ordered immunizations, labs, imaging, and/or referrals for you. A list of these orders (if applicable) as well as your Preventive Care list are included within your After Visit Summary for your review. Other Preventive Recommendations:    · A preventive eye exam performed by an eye specialist is recommended every 1-2 years to screen for glaucoma; cataracts, macular degeneration, and other eye disorders. · A preventive dental visit is recommended every 6 months. · Try to get at least 150 minutes of exercise per week or 10,000 steps per day on a pedometer . · Order or download the FREE \"Exercise & Physical Activity: Your Everyday Guide\" from [employer]. Call 3-384.710.9992 or search [employer] online. · You need 4306-4509 mg of calcium and 5383-9661 IU of vitamin D per day. It is possible to meet your calcium requirement with diet alone, but a vitamin D supplement is usually necessary to meet this goal.  · When exposed to the sun, use a sunscreen that protects against both UVA and UVB radiation with an SPF of 30 or greater.  Reapply every 2 to 3 hours or after sweating, drying off with a towel, or

## 2021-08-26 NOTE — PROGRESS NOTES
Subjective:      Patient ID: Becky Jackson is a 73 y.o. male. HPI: 3 month Follow Up    Chief Complaint   Patient presents with    Medicare AWV    3 Month Follow-Up    Medication Refill       He is on Metformin 1000 mg BID, Jardiance 25 mg,  Actos 45 mg and Trulcity. Was to be taking Lantus 30 units BID    Minimal change with A1C.   11.3 to 11. Lab Results   Component Value Date    LABA1C 11.0 08/26/2021       Patient Active Problem List   Diagnosis    Controlled type 2 diabetes mellitus without complication, without long-term current use of insulin ([address]    Essential hypertension    History of CVA (cerebrovascular accident)    Mixed hyperlipidemia    Obesity (BMI 30-39. 9)       COLONOSCOPY - 2016    Overall doing well. Denies CP, SOB or chest tightness. No smoking hx. BP Readings from Last 3 Encounters:   08/26/21 138/78   05/27/21 134/70   02/26/21 128/74       BP wnl. Losartan 50 mg. Wt Readings from Last 3 Encounters:   08/26/21 276 lb (125.2 kg)   05/27/21 281 lb 11.2 oz (127.8 kg)   02/26/21 280 lb 12.8 oz (127.4 kg)       Labs reviewed. Lab Results   Component Value Date    LABA1C 11.0 08/26/2021    LABA1C 11.3 (H) 05/27/2021    LABA1C 13.8 01/26/2021     Lab Results   Component Value Date     07/02/2015       Lipitor 40 mg. Tolerating well.      No components found for: CHLPL  Lab Results   Component Value Date    TRIG 142 05/27/2021    TRIG 139 04/17/2020    TRIG 65 01/18/2018     Lab Results   Component Value Date    HDL 42 05/27/2021    HDL 46 04/17/2020    HDL 57 01/18/2018     Lab Results   Component Value Date    LDLCALC 166 05/27/2021    LDLCALC 185 04/17/2020    LDLCALC 62 01/18/2018     No results found for: LABVLDL      Chemistry        Component Value Date/Time     05/27/2021 1525    K 4.8 05/27/2021 1525     05/27/2021 1525    CO2 30 05/27/2021 1525    BUN 10 05/27/2021 1525    CREATININE 0.9 05/27/2021 1525        Component Value Date/Time CALCIUM 9.2 05/27/2021 1525    ALKPHOS 111 05/27/2021 1525    AST 20 05/27/2021 1525    ALT 22 05/27/2021 1525    BILITOT 0.2 (L) 05/27/2021 1525          Lab Results   Component Value Date    TSH 2.690 01/14/2017       Lab Results   Component Value Date    WBC 6.0 05/27/2021    HGB 16.4 05/27/2021    HCT 50.0 05/27/2021    MCV 93.1 05/27/2021     05/27/2021       Health Maintenance   Topic Date Due    COVID-19 Vaccine (1) Never done    HIV screen  Never done    Shingles Vaccine (1 of 2) Never done    Diabetic retinal exam  04/02/2020    Annual Wellness Visit (AWV)  Never done    Flu vaccine (1) 09/01/2021    A1C test (Diabetic or Prediabetic)  11/26/2021    Pneumococcal 65+ years Vaccine (1 of 1 - PPSV23) 01/11/2022    Diabetic foot exam  01/26/2022    Diabetic microalbuminuria test  05/27/2022    Lipid screen  05/27/2022    Potassium monitoring  05/27/2022    Creatinine monitoring  05/27/2022    Colon cancer screen colonoscopy  01/09/2027    DTaP/Tdap/Td vaccine (2 - Td or Tdap) 01/11/2027    Hepatitis C screen  Completed    Hepatitis A vaccine  Aged Out    Hib vaccine  Aged Out    Meningococcal (ACWY) vaccine  Aged [employer] History   Administered Date(s) Administered    Influenza, Quadv, IM, (6 mo and older Fluzone, Flulaval, Fluarix and 3 yrs and older Afluria) 01/11/2017, 10/18/2018    Pneumococcal Polysaccharide ([id number]) 01/11/2017    Tdap (Boostrix, Adacel) 01/11/2017       Review of Systems   HENT: Negative. Respiratory: Negative for shortness of breath. Genitourinary: Negative for dysuria and frequency. Neurological: Negative for light-headedness. Psychiatric/Behavioral: Negative. Objective:   Physical Exam  Constitutional:       General: He is not in acute distress. Eyes:      Pupils: Pupils are equal, round, and reactive to light. Cardiovascular:      Rate and Rhythm: Normal rate and regular rhythm. Heart sounds: No murmur heard. Pulmonary:      Effort: Pulmonary effort is normal.      Breath sounds: Normal breath sounds. No wheezing. Abdominal:      General: Bowel sounds are normal. There is no distension. Palpations: Abdomen is soft. Tenderness: There is no abdominal tenderness. Musculoskeletal:         General: No tenderness. Normal range of motion. Cervical back: Normal range of motion and neck supple. Skin:     General: Skin is warm and dry. Findings: No rash. Assessment:       Diagnosis Orders   1. Routine general medical examination at a health care facility     2. Essential hypertension  losartan (COZAAR) 50 MG tablet   3. Severe obesity (BMI 35.0-35.9 with comorbidity) ([facility] [address]     4. Controlled type 2 diabetes mellitus without complication, without long-term current use of insulin (HCC)  POCT glycosylated hemoglobin (Hb A1C)   5. Obesity (BMI 30-39.9)     6. Mixed hyperlipidemia     7. History of CVA (cerebrovascular accident)             Plan:       Chronic conditions stable except DM  Labs reviewed  Refills as above   - Novolog 10 mg TID with meals  Diet and exercise stressed  Immunizations discussed  RTO in 2 weeks with BS lashaun Payne received counseling on the following healthy behaviors: nutrition and exercise  Reviewed prior labs and health maintenance  Continue current medications, diet and exercise. Discussed use, benefit, and side effects of prescribed medications. Barriers to medication compliance addressed. Patient given educational materials - see patient instructions  Was a self-tracking handout given in paper form or via [employer]? No:     Requested Prescriptions     Signed Prescriptions Disp Refills    losartan (COZAAR) 50 MG tablet 30 tablet 11     Sig: take 1 tablet by mouth once daily    insulin aspart (NOVOLOG FLEXPEN) 100 UNIT/ML injection pen 5 pen 3     Sig: Inject 10 Units into the skin 3 times daily (before meals)       All patient questions answered.   Patient voiced understanding. Quality Measures    Body mass index is 36.41 kg/m². Elevated. Weight control planned discussed Healthy diet and regular exercise. BP: 138/78 Blood pressure is normal. Treatment plan consists of No treatment change needed.     Lab Results   Component Value Date    LDLCALC 166 05/27/2021    LDLDIRECT 178 (H) 07/02/2015    (goal LDL reduction with dx if diabetes is 50% LDL reduction)      PHQ Scores 8/26/2021 1/26/2021 4/21/2020 4/24/2019 1/19/2018 1/11/2017   PHQ2 Score 0 0 0 0 0 0   PHQ9 Score 0 0 0 0 0 0     Interpretation of Total Score Depression Severity: 1-4 = Minimal depression, 5-9 = Mild depression, 10-14 = Moderate depression, 15-19 = Moderately severe depression, 20-27 = Severe depression

## 2021-08-26 NOTE — PROGRESS NOTES
Medicare Annual Wellness Visit  Name: Conception Ly Date: 2021   MRN: [id number] Sex: Male   Age: 73 y.o. Ethnicity: Non- / Non    : 1955 Race: White (non-)      Roger Muir is here for Medicare AWV, 3 Month Follow-Up, Medication Refill, and Foot Injury (left foot injury at work, pt thinks he hit his foot on something)    Screenings for behavioral, psychosocial and functional/safety risks, and cognitive dysfunction are all negative except as indicated below. These results, as well as other patient data from the [address] form, are documented in Flowsheets linked to this Encounter. Allergies   Allergen Reactions    Terramycin [Oxytetracycline-Polymyxin B] Swelling         Prior to Visit Medications    Medication Sig Taking?  Authorizing Provider   Dulaglutide (TRULICITY) 3 AY/4.6AV SOPN Inject 3 mg into the skin once a week Yes NILE Zapata CNP   insulin glargine (LANTUS SOLOSTAR) 100 UNIT/ML injection pen Inject 30 Units into the skin 2 times daily Yes NILE Zapata CNP   pioglitazone (ACTOS) 45 MG tablet take 1 tablet by mouth once daily Yes NILE Zapata CNP   empagliflozin (JARDIANCE) 25 MG tablet Take 25 mg by mouth daily Yes NILE Zapata CNP   metFORMIN (GLUCOPHAGE) 1000 MG tablet take 1 tablet by mouth twice a day with meals Yes NILE Zapata CNP   Insulin Pen Needle (MEIJER PEN NEEDLES) 31G X 8 MM MISC 1 each by Does not apply route daily Yes NILE Zapata CNP   losartan (COZAAR) 50 MG tablet take 1 tablet by mouth once daily Yes NILE Zapata CNP   atorvastatin (LIPITOR) 40 MG tablet Take 1 tablet by mouth daily Yes NILE Zapata CNP   NONFORMULARY moringo nuts two by mouth daily Yes Historical Provider, MD   aspirin 81 MG tablet Take 1 tablet by mouth daily Yes NILE Zapata CNP   Turmeric 500 MG CAPS Take by mouth daily  Yes Historical Provider, MD Past Medical History:   Diagnosis Date    CVA (cerebral vascular accident) ([facility] [address] 06/28/2015    Diabetes ([facility] [address]     Hypertension     Kidney stones 1986       Past Surgical History:   Procedure Laterality Date    COLONOSCOPY      TONSILLECTOMY      1963    TUMOR EXCISION Left 2009? mid back, benign         Family History   Problem Relation Age of Onset    Diabetes Mother     Dementia Mother     Diabetes Father     Heart Disease Father     High Blood Pressure Father     Diabetes Maternal Grandmother     Heart Disease Paternal Grandmother     Heart Disease Paternal Grandfather        CareTeam (Including outside providers/suppliers regularly involved in providing care):   Patient Care Team:  NILE Henderson CNP as PCP - General (Certified Nurse Practitioner)  NILE Henderson CNP as PCP - [facility] Empaneled Provider    Wt Readings from Last 3 Encounters:   08/26/21 276 lb (125.2 kg)   05/27/21 281 lb 11.2 oz (127.8 kg)   02/26/21 280 lb 12.8 oz (127.4 kg)     Vitals:    08/26/21 1322   BP: 138/78   Site: Right Upper Arm   Position: Sitting   Cuff Size: Large Adult   Pulse: 84   Resp: 20   Weight: 276 lb (125.2 kg)   Height: 6' 1\" (1.854 m)     Body mass index is 36.41 kg/m². Based upon direct observation of the patient, evaluation of cognition reveals recent and remote memory intact. Patient's complete Health Risk Assessment and screening values have been reviewed and are found in Flowsheets. The following problems were reviewed today and where indicated follow up appointments were made and/or referrals ordered. Positive Risk Factor Screenings with Interventions:            General Health and ACP:  General  In general, how would you say your health is?: Very Good  In the past 7 days, have you experienced any of the following?  New or Increased Pain, New or Increased Fatigue, Loneliness, Social Isolation, Stress or Anger?: (!) New or Increased Pain  Do you get the social and emotional support that you need?: Yes  Do you have a Living Will?: (!) No  Advance Directives     Power of [address] Will ACP-Advance Directive ACP-Power of     Not on File Not on File Not on File Not on File      General Health Risk Interventions:  · No Living Will: Advance Care Planning addressed with patient today    Health Habits/Nutrition:  Health Habits/Nutrition  Do you exercise for at least 20 minutes 2-3 times per week?: Yes  Have you lost any weight without trying in the past 3 months?: No  Do you eat only one meal per day?: (!) Yes  Have you seen the dentist within the past year?: (!) No  Body mass index: (!) 36.41  Health Habits/Nutrition Interventions:  · Nutritional issues:  educational materials for healthy, well-balanced diet provided    Hearing/Vision:  No exam data present  Hearing/Vision  Do you or your family notice any trouble with your hearing that hasn't been managed with hearing aids?: No  Do you have difficulty driving, watching TV, or doing any of your daily activities because of your eyesight?: No  Have you had an eye exam within the past year?: (!) No  Hearing/Vision Interventions:  · Vision concerns:  patient encouraged to make appointment with his/her eye specialist    Safety:  Safety  Do you have working smoke detectors?: Yes  Have all throw rugs been removed or fastened?: Yes  Do you have non-slip mats or surfaces in all bathtubs/showers?: (!) No  Do all of your stairways have a railing or banister?: Yes  Are your doorways, halls and stairs free of clutter?: Yes  Do you always fasten your seatbelt when you are in a car?: (!) No  Safety Interventions:  · Home safety tips provided     Personalized Preventive Plan   Current Health Maintenance Status  Immunization History   Administered Date(s) Administered    Influenza, Quadv, IM, (6 mo and older Fluzone, Flulaval, Fluarix and 3 yrs and older Afluria) 01/11/2017, 10/18/2018    Pneumococcal Polysaccharide ([id number]) 01/11/2017    Tdap (Boostrix, Adacel) 01/11/2017        Health Maintenance   Topic Date Due    COVID-19 Vaccine (1) Never done    HIV screen  Never done    Shingles Vaccine (1 of 2) Never done    Diabetic retinal exam  04/02/2020    Annual Wellness Visit (AWV)  Never done    A1C test (Diabetic or Prediabetic)  08/27/2021    Flu vaccine (1) 09/01/2021    Pneumococcal 65+ years Vaccine (1 of 1 - PPSV23) 01/11/2022    Diabetic foot exam  01/26/2022    Diabetic microalbuminuria test  05/27/2022    Lipid screen  05/27/2022    Potassium monitoring  05/27/2022    Creatinine monitoring  05/27/2022    Colon cancer screen colonoscopy  01/09/2027    DTaP/Tdap/Td vaccine (2 - Td or Tdap) 01/11/2027    Hepatitis C screen  Completed    Hepatitis A vaccine  Aged Out    Hib vaccine  Aged Out    Meningococcal (ACWY) vaccine  Aged Out     Recommendations for [employer] Due: see orders and patient instructions/AVS.  . Recommended screening schedule for the next 5-10 years is provided to the patient in written form: see Patient Instructions/AVS.    Michaelsharonacarolyn Arshad was seen today for medicare awv, 3 month follow-up, medication refill and foot injury.     Diagnoses and all orders for this visit:    Routine general medical examination at a health care facility    Essential hypertension    Severe obesity (BMI 35.0-35.9 with comorbidity) ([facility] [address]

## 2021-09-09 ENCOUNTER — OFFICE VISIT (OUTPATIENT)
Dept: FAMILY MEDICINE CLINIC | Age: 66
End: 2021-09-09
Payer: MEDICARE

## 2021-09-09 VITALS
RESPIRATION RATE: 18 BRPM | BODY MASS INDEX: 36.4 KG/M2 | DIASTOLIC BLOOD PRESSURE: 86 MMHG | SYSTOLIC BLOOD PRESSURE: 124 MMHG | HEART RATE: 76 BPM | WEIGHT: 275.9 LBS

## 2021-09-09 DIAGNOSIS — Z86.73 HISTORY OF CVA (CEREBROVASCULAR ACCIDENT): ICD-10-CM

## 2021-09-09 DIAGNOSIS — Z79.4 CONTROLLED TYPE 2 DIABETES MELLITUS WITH HYPERGLYCEMIA, WITH LONG-TERM CURRENT USE OF INSULIN (HCC): Primary | ICD-10-CM

## 2021-09-09 DIAGNOSIS — E66.9 OBESITY (BMI 30-39.9): ICD-10-CM

## 2021-09-09 DIAGNOSIS — E11.65 CONTROLLED TYPE 2 DIABETES MELLITUS WITH HYPERGLYCEMIA, WITH LONG-TERM CURRENT USE OF INSULIN (HCC): Primary | ICD-10-CM

## 2021-09-09 DIAGNOSIS — E78.2 MIXED HYPERLIPIDEMIA: ICD-10-CM

## 2021-09-09 DIAGNOSIS — I10 ESSENTIAL HYPERTENSION: ICD-10-CM

## 2021-09-09 PROCEDURE — G8427 DOCREV CUR MEDS BY ELIG CLIN: HCPCS | Performed by: NURSE PRACTITIONER

## 2021-09-09 PROCEDURE — 3017F COLORECTAL CA SCREEN DOC REV: CPT | Performed by: NURSE PRACTITIONER

## 2021-09-09 PROCEDURE — 1036F TOBACCO NON-USER: CPT | Performed by: NURSE PRACTITIONER

## 2021-09-09 PROCEDURE — 1123F ACP DISCUSS/DSCN MKR DOCD: CPT | Performed by: NURSE PRACTITIONER

## 2021-09-09 PROCEDURE — 4040F PNEUMOC VAC/ADMIN/RCVD: CPT | Performed by: NURSE PRACTITIONER

## 2021-09-09 PROCEDURE — 2022F DILAT RTA XM EVC RTNOPTHY: CPT | Performed by: NURSE PRACTITIONER

## 2021-09-09 PROCEDURE — 3046F HEMOGLOBIN A1C LEVEL >9.0%: CPT | Performed by: NURSE PRACTITIONER

## 2021-09-09 PROCEDURE — 99214 OFFICE O/P EST MOD 30 MIN: CPT | Performed by: NURSE PRACTITIONER

## 2021-09-09 PROCEDURE — G8417 CALC BMI ABV UP PARAM F/U: HCPCS | Performed by: NURSE PRACTITIONER

## 2021-09-09 RX ORDER — ATORVASTATIN CALCIUM 80 MG/1
80 TABLET, FILM COATED ORAL DAILY
Qty: 90 TABLET | Refills: 3 | Status: SHIPPED | OUTPATIENT
Start: 2021-09-09 | End: 2022-09-15 | Stop reason: SDUPTHER

## 2021-09-09 RX ORDER — BLOOD-GLUCOSE METER
1 EACH MISCELLANEOUS 4 TIMES DAILY
Qty: 300 EACH | Refills: 3 | Status: SHIPPED | OUTPATIENT
Start: 2021-09-09 | End: 2022-09-15

## 2021-09-09 ASSESSMENT — ENCOUNTER SYMPTOMS: SHORTNESS OF BREATH: 0

## 2021-09-09 NOTE — PROGRESS NOTES
Subjective:      Patient ID: Dominick Gray is a 73 y.o. male. HPI: 2 week Follow Up    Chief Complaint   Patient presents with    Follow-up    Diabetes    Medication Refill       He is on Metformin 1000 mg BID, Jardiance 25 mg,  Actos 45 mg and Trulcity. On Lantus 30 units BID    2 weeks ago was started on Novolog 10 mg TID with meals for A1C of 11. Did not start due to Meter broke to check his sugars. Lab Results   Component Value Date    LABA1C 11.0 08/26/2021       Patient Active Problem List   Diagnosis    Controlled type 2 diabetes mellitus without complication, without long-term current use of insulin ([address]    Essential hypertension    History of CVA (cerebrovascular accident)    Mixed hyperlipidemia    Obesity (BMI 30-39. 9)       COLONOSCOPY - 2016    Overall doing well. Denies CP, SOB or chest tightness. No smoking hx. BP Readings from Last 3 Encounters:   09/09/21 124/86   08/26/21 138/78   05/27/21 134/70       BP wnl. Losartan 50 mg. Wt Readings from Last 3 Encounters:   09/09/21 275 lb 14.4 oz (125.1 kg)   08/26/21 276 lb (125.2 kg)   05/27/21 281 lb 11.2 oz (127.8 kg)       Labs reviewed. Lab Results   Component Value Date    LABA1C 11.0 08/26/2021    LABA1C 11.3 (H) 05/27/2021    LABA1C 13.8 01/26/2021     Lab Results   Component Value Date     07/02/2015       Lipitor 40 mg. Tolerating well.      No components found for: CHLPL  Lab Results   Component Value Date    TRIG 142 05/27/2021    TRIG 139 04/17/2020    TRIG 65 01/18/2018     Lab Results   Component Value Date    HDL 42 05/27/2021    HDL 46 04/17/2020    HDL 57 01/18/2018     Lab Results   Component Value Date    LDLCALC 166 05/27/2021    LDLCALC 185 04/17/2020    LDLCALC 62 01/18/2018     No results found for: LABVLDL      Chemistry        Component Value Date/Time     05/27/2021 1525    K 4.8 05/27/2021 1525     05/27/2021 1525    CO2 30 05/27/2021 1525    BUN 10 05/27/2021 1525 CREATININE 0.9 05/27/2021 1525        Component Value Date/Time    CALCIUM 9.2 05/27/2021 1525    ALKPHOS 111 05/27/2021 1525    AST 20 05/27/2021 1525    ALT 22 05/27/2021 1525    BILITOT 0.2 (L) 05/27/2021 1525          Lab Results   Component Value Date    TSH 2.690 01/14/2017       Lab Results   Component Value Date    WBC 6.0 05/27/2021    HGB 16.4 05/27/2021    HCT 50.0 05/27/2021    MCV 93.1 05/27/2021     05/27/2021       Health Maintenance   Topic Date Due    HIV screen  Never done    Shingles Vaccine (1 of 2) Never done    Diabetic retinal exam  04/02/2020    Flu vaccine (1) 09/01/2021    A1C test (Diabetic or Prediabetic)  11/26/2021    Pneumococcal 65+ years Vaccine (1 of 1 - PPSV23) 01/11/2022    Diabetic foot exam  01/26/2022    Diabetic microalbuminuria test  05/27/2022    Lipid screen  05/27/2022    Potassium monitoring  05/27/2022    Creatinine monitoring  05/27/2022    Annual Wellness Visit (AWV)  08/27/2022    Colon cancer screen colonoscopy  01/09/2027    DTaP/Tdap/Td vaccine (2 - Td or Tdap) 01/11/2027    COVID-19 Vaccine  Completed    Hepatitis C screen  Completed    Hepatitis A vaccine  Aged Out    Hib vaccine  Aged Out    Meningococcal (ACWY) vaccine  Aged [employer] History   Administered Date(s) Administered    COVID-19, J&J, PF, 0.5 mL 09/01/2021    Influenza, Quadv, IM, (6 mo and older Fluzone, Flulaval, Fluarix and 3 yrs and older Afluria) 01/11/2017, 10/18/2018    Pneumococcal Polysaccharide ([id number]) 01/11/2017    Tdap (Boostrix, Adacel) 01/11/2017       Review of Systems   HENT: Negative. Respiratory: Negative for shortness of breath. Genitourinary: Negative for dysuria and frequency. Neurological: Negative for light-headedness. Psychiatric/Behavioral: Negative. Objective:   Physical Exam  Constitutional:       General: He is not in acute distress. Eyes:      Pupils: Pupils are equal, round, and reactive to light. Cardiovascular:      Rate and Rhythm: Normal rate and regular rhythm. Heart sounds: No murmur heard. Pulmonary:      Effort: Pulmonary effort is normal.      Breath sounds: Normal breath sounds. No wheezing. Abdominal:      General: Bowel sounds are normal. There is no distension. Palpations: Abdomen is soft. Tenderness: There is no abdominal tenderness. Musculoskeletal:         General: No tenderness. Normal range of motion. Cervical back: Normal range of motion and neck supple. Skin:     General: Skin is warm and dry. Findings: No rash. Assessment:       Diagnosis Orders   1. Controlled type 2 diabetes mellitus with hyperglycemia, with long-term current use of insulin ([address]     2. Mixed hyperlipidemia  atorvastatin (LIPITOR) 80 MG tablet   3. Essential hypertension     4. Obesity (BMI 30-39.9)     5. History of CVA (cerebrovascular accident)             Plan:       No BS log due to meter broke  Education given on insulin and DM  Chronic conditions  Labs reviewed  Refills as above   - increase Lipitor 80 mg  Diet and exercise stressed  Immunizations discussed - Shingles vaccine at pharmacy  RTO in 3 months - drop off BS log in 2 weeks        Pinky Salud received counseling on the following healthy behaviors: nutrition and exercise  Reviewed prior labs and health maintenance  Continue current medications, diet and exercise. Discussed use, benefit, and side effects of prescribed medications. Barriers to medication compliance addressed. Patient given educational materials - see patient instructions  Was a self-tracking handout given in paper form or via [employer]? No:     Requested Prescriptions     Signed Prescriptions Disp Refills    blood glucose test strips (RELION PREMIER TEST) strip 300 each 3     Si each by In Vitro route 4 times daily As needed.     atorvastatin (LIPITOR) 80 MG tablet 90 tablet 3     Sig: Take 1 tablet by mouth daily       All patient questions answered. Patient voiced understanding. Quality Measures    Body mass index is 36.4 kg/m². Elevated. Weight control planned discussed Healthy diet and regular exercise. BP: 124/86 Blood pressure is normal. Treatment plan consists of No treatment change needed.     Lab Results   Component Value Date    LDLCALC 166 05/27/2021    LDLDIRECT 178 (H) 07/02/2015    (goal LDL reduction with dx if diabetes is 50% LDL reduction)      PHQ Scores 8/26/2021 1/26/2021 4/21/2020 4/24/2019 1/19/2018 1/11/2017   PHQ2 Score 0 0 0 0 0 0   PHQ9 Score 0 0 0 0 0 0     Interpretation of Total Score Depression Severity: 1-4 = Minimal depression, 5-9 = Mild depression, 10-14 = Moderate depression, 15-19 = Moderately severe depression, 20-27 = Severe depression

## 2021-12-13 ENCOUNTER — OFFICE VISIT (OUTPATIENT)
Dept: FAMILY MEDICINE CLINIC | Age: 66
End: 2021-12-13
Payer: MEDICARE

## 2021-12-13 VITALS
DIASTOLIC BLOOD PRESSURE: 80 MMHG | SYSTOLIC BLOOD PRESSURE: 138 MMHG | HEIGHT: 73 IN | RESPIRATION RATE: 16 BRPM | WEIGHT: 277.6 LBS | BODY MASS INDEX: 36.79 KG/M2 | HEART RATE: 84 BPM

## 2021-12-13 DIAGNOSIS — I10 ESSENTIAL HYPERTENSION: ICD-10-CM

## 2021-12-13 DIAGNOSIS — L24.9 IRRITANT DERMATITIS: ICD-10-CM

## 2021-12-13 DIAGNOSIS — E66.9 OBESITY (BMI 30-39.9): ICD-10-CM

## 2021-12-13 DIAGNOSIS — E11.65 UNCONTROLLED TYPE 2 DIABETES MELLITUS WITH HYPERGLYCEMIA (HCC): Primary | ICD-10-CM

## 2021-12-13 DIAGNOSIS — Z86.73 HISTORY OF CVA (CEREBROVASCULAR ACCIDENT): ICD-10-CM

## 2021-12-13 DIAGNOSIS — E78.2 MIXED HYPERLIPIDEMIA: ICD-10-CM

## 2021-12-13 DIAGNOSIS — K59.00 CONSTIPATION, UNSPECIFIED CONSTIPATION TYPE: ICD-10-CM

## 2021-12-13 LAB — HBA1C MFR BLD: 10.9 %

## 2021-12-13 PROCEDURE — G8417 CALC BMI ABV UP PARAM F/U: HCPCS | Performed by: NURSE PRACTITIONER

## 2021-12-13 PROCEDURE — G8427 DOCREV CUR MEDS BY ELIG CLIN: HCPCS | Performed by: NURSE PRACTITIONER

## 2021-12-13 PROCEDURE — 3017F COLORECTAL CA SCREEN DOC REV: CPT | Performed by: NURSE PRACTITIONER

## 2021-12-13 PROCEDURE — 2022F DILAT RTA XM EVC RTNOPTHY: CPT | Performed by: NURSE PRACTITIONER

## 2021-12-13 PROCEDURE — 4040F PNEUMOC VAC/ADMIN/RCVD: CPT | Performed by: NURSE PRACTITIONER

## 2021-12-13 PROCEDURE — 1036F TOBACCO NON-USER: CPT | Performed by: NURSE PRACTITIONER

## 2021-12-13 PROCEDURE — 1123F ACP DISCUSS/DSCN MKR DOCD: CPT | Performed by: NURSE PRACTITIONER

## 2021-12-13 PROCEDURE — 99214 OFFICE O/P EST MOD 30 MIN: CPT | Performed by: NURSE PRACTITIONER

## 2021-12-13 PROCEDURE — 3046F HEMOGLOBIN A1C LEVEL >9.0%: CPT | Performed by: NURSE PRACTITIONER

## 2021-12-13 PROCEDURE — G8484 FLU IMMUNIZE NO ADMIN: HCPCS | Performed by: NURSE PRACTITIONER

## 2021-12-13 PROCEDURE — 83036 HEMOGLOBIN GLYCOSYLATED A1C: CPT | Performed by: NURSE PRACTITIONER

## 2021-12-13 RX ORDER — POLYETHYLENE GLYCOL 3350 17 G/17G
17 POWDER, FOR SOLUTION ORAL DAILY
Qty: 510 G | Refills: 5 | Status: SHIPPED | OUTPATIENT
Start: 2021-12-13 | End: 2022-01-12

## 2021-12-13 RX ORDER — PIOGLITAZONEHYDROCHLORIDE 45 MG/1
TABLET ORAL
Qty: 30 TABLET | Refills: 11 | Status: SHIPPED | OUTPATIENT
Start: 2021-12-13 | End: 2022-09-15 | Stop reason: SDUPTHER

## 2021-12-13 RX ORDER — EMPAGLIFLOZIN 25 MG/1
25 TABLET, FILM COATED ORAL DAILY
Qty: 30 TABLET | Refills: 11 | Status: SHIPPED | OUTPATIENT
Start: 2021-12-13 | End: 2022-09-15 | Stop reason: SDUPTHER

## 2021-12-13 ASSESSMENT — ENCOUNTER SYMPTOMS
CONSTIPATION: 1
SHORTNESS OF BREATH: 0

## 2021-12-13 NOTE — PROGRESS NOTES
Subjective:      Patient ID: Erwin Will is a 73 y.o. male. HPI: 3 month Follow Up    Chief Complaint   Patient presents with    3 Month Follow-Up    Other     Discuss covid booster       He is to be on Metformin 1000 mg BID, Jardiance 25 mg,  Actos 45 mg and Trulcity. On Lantus 30 units BID and Novolog 10 u TID    Has not been taking Novolog 10 mg TID due to unable to get blood for sugar checks. Has not been taking Jardiance and Actos due to constipation     A1C no change. Lab Results   Component Value Date    LABA1C 10.9 12/13/2021    LABA1C 11.0 08/26/2021    LABA1C 11.3 (H) 05/27/2021     Lab Results   Component Value Date     07/02/2015       Patient Active Problem List   Diagnosis    Controlled type 2 diabetes mellitus without complication, without long-term current use of insulin ([facility] [address]    Essential hypertension    History of CVA (cerebrovascular accident)    Mixed hyperlipidemia    Obesity (BMI 30-39. 9)       COLONOSCOPY - 2016    Overall doing well. Denies CP, SOB or chest tightness. No smoking hx. BP Readings from Last 3 Encounters:   12/13/21 138/80   09/09/21 124/86   08/26/21 138/78       BP wnl. Losartan 50 mg. Wt Readings from Last 3 Encounters:   12/13/21 277 lb 9.6 oz (125.9 kg)   09/09/21 275 lb 14.4 oz (125.1 kg)   08/26/21 276 lb (125.2 kg)       Labs reviewed. Lipitor 40 mg. Tolerating well.      No components found for: CHLPL  Lab Results   Component Value Date    TRIG 142 05/27/2021    TRIG 139 04/17/2020    TRIG 65 01/18/2018     Lab Results   Component Value Date    HDL 42 05/27/2021    HDL 46 04/17/2020    HDL 57 01/18/2018     Lab Results   Component Value Date    LDLCALC 166 05/27/2021    LDLCALC 185 04/17/2020    LDLCALC 62 01/18/2018     No results found for: LABVLDL      Chemistry        Component Value Date/Time     05/27/2021 1525    K 4.8 05/27/2021 1525     05/27/2021 1525    CO2 30 05/27/2021 1525    BUN 10 05/27/2021 1525 CREATININE 0.9 05/27/2021 1525        Component Value Date/Time    CALCIUM 9.2 05/27/2021 1525    ALKPHOS 111 05/27/2021 1525    AST 20 05/27/2021 1525    ALT 22 05/27/2021 1525    BILITOT 0.2 (L) 05/27/2021 1525          Lab Results   Component Value Date    TSH 2.690 01/14/2017       Lab Results   Component Value Date    WBC 6.0 05/27/2021    HGB 16.4 05/27/2021    HCT 50.0 05/27/2021    MCV 93.1 05/27/2021     05/27/2021       Health Maintenance   Topic Date Due    HIV screen  Never done    Shingles Vaccine (1 of 2) Never done    Flu vaccine (1) 09/01/2021    COVID-19 Vaccine (2 - Booster for [place] series) 10/27/2021    Pneumococcal 65+ years Vaccine (1 of 1 - PPSV23) 01/11/2022    Diabetic foot exam  01/26/2022    A1C test (Diabetic or Prediabetic)  03/13/2022    Diabetic microalbuminuria test  05/27/2022    Lipid screen  05/27/2022    Potassium monitoring  05/27/2022    Creatinine monitoring  05/27/2022    Annual Wellness Visit (AWV)  08/27/2022    Diabetic retinal exam  12/02/2022    Colon cancer screen colonoscopy  01/09/2027    DTaP/Tdap/Td vaccine (2 - Td or Tdap) 01/11/2027    Hepatitis C screen  Completed    Hepatitis A vaccine  Aged Out    Hib vaccine  Aged Out    Meningococcal (ACWY) vaccine  Aged [employer] History   Administered Date(s) Administered    COVID-19, J&J, PF, 0.5 mL 09/01/2021    Influenza, Quadv, IM, (6 mo and older Fluzone, Flulaval, Fluarix and 3 yrs and older Afluria) 01/11/2017, 10/18/2018    Pneumococcal Polysaccharide ([id number]) 01/11/2017    Tdap (Boostrix, Adacel) 01/11/2017       Review of Systems   HENT: Negative. Respiratory: Negative for shortness of breath. Gastrointestinal: Positive for constipation. Genitourinary: Negative for dysuria and frequency. Skin: Positive for rash. Neurological: Negative for light-headedness. Psychiatric/Behavioral: Negative.         Objective:   Physical Exam  Constitutional: General: He is not in acute distress. Eyes:      Pupils: Pupils are equal, round, and reactive to light. Cardiovascular:      Rate and Rhythm: Normal rate and regular rhythm. Heart sounds: No murmur heard. Pulmonary:      Effort: Pulmonary effort is normal.      Breath sounds: Normal breath sounds. No wheezing. Abdominal:      General: Bowel sounds are normal. There is no distension. Palpations: Abdomen is soft. Tenderness: There is no abdominal tenderness. Musculoskeletal:         General: No tenderness. Normal range of motion. Cervical back: Normal range of motion and neck supple. Skin:     General: Skin is warm and dry. Findings: Rash present. Assessment:       Diagnosis Orders   1. Uncontrolled type 2 diabetes mellitus with hyperglycemia (HCC)  Cholecalciferol (VITAMIN D3 PO)    POCT glycosylated hemoglobin (Hb A1C)    pioglitazone (ACTOS) 45 MG tablet    empagliflozin (JARDIANCE) 25 MG tablet    [facility]    Comprehensive Metabolic Panel    Hemoglobin A1C    MICROALBUMIN, RANDOM URINE (W/O CREATININE)   2. Constipation, unspecified constipation type  polyethylene glycol (GLYCOLAX) 17 GM/SCOOP powder   3. Mixed hyperlipidemia  CBC    Lipid Panel    TSH with Reflex   4. Essential hypertension     5. Obesity (BMI 30-39.9)     6. History of CVA (cerebrovascular accident)     7.  Irritant dermatitis  fluocinonide (LIDEX) 0.05 % cream           Plan:       No BS log due to no check   - benefit from CGM  Refer to [facility] for aid in DM control  Chronic conditions except DM  Labs reviewed  Refills as above   - restart Jardiance and Actos   - Miralax for constipation  Diet and exercise stressed  Immunizations discussed - Shingles vaccine at pharmacy  RTO in 4 months        Brandon Bettyurban received counseling on the following healthy behaviors: nutrition and exercise  Reviewed prior labs and health maintenance  Continue current medications, diet and exercise. Discussed use, benefit, and side effects of prescribed medications. Barriers to medication compliance addressed. Patient given educational materials - see patient instructions  Was a self-tracking handout given in paper form or via [employer]? No:     Requested Prescriptions     Signed Prescriptions Disp Refills    pioglitazone (ACTOS) 45 MG tablet 30 tablet 11     Sig: take 1 tablet by mouth once daily    empagliflozin (JARDIANCE) 25 MG tablet 30 tablet 11     Sig: Take 25 mg by mouth daily    polyethylene glycol (GLYCOLAX) 17 GM/SCOOP powder 510 g 5     Sig: Take 17 g by mouth daily    fluocinonide (LIDEX) 0.05 % cream 120 g 1     Sig: Apply topically 2 times daily. All patient questions answered. Patient voiced understanding. Quality Measures    Body mass index is 36.62 kg/m². Elevated. Weight control planned discussed Healthy diet and regular exercise. BP: 138/80 Blood pressure is normal. Treatment plan consists of No treatment change needed.     Lab Results   Component Value Date    LDLCALC 166 05/27/2021    LDLDIRECT 178 (H) 07/02/2015    (goal LDL reduction with dx if diabetes is 50% LDL reduction)      PHQ Scores 8/26/2021 1/26/2021 4/21/2020 4/24/2019 1/19/2018 1/11/2017   PHQ2 Score 0 0 0 0 0 0   PHQ9 Score 0 0 0 0 0 0     Interpretation of Total Score Depression Severity: 1-4 = Minimal depression, 5-9 = Mild depression, 10-14 = Moderate depression, 15-19 = Moderately severe depression, 20-27 = Severe depression

## 2022-01-31 ENCOUNTER — OFFICE VISIT (OUTPATIENT)
Dept: INTERNAL MEDICINE CLINIC | Age: 67
End: 2022-01-31
Payer: COMMERCIAL

## 2022-01-31 ENCOUNTER — TELEPHONE (OUTPATIENT)
Dept: FAMILY MEDICINE CLINIC | Age: 67
End: 2022-01-31

## 2022-01-31 VITALS — WEIGHT: 274 LBS | TEMPERATURE: 98.2 F | BODY MASS INDEX: 36.31 KG/M2 | HEIGHT: 73 IN

## 2022-01-31 DIAGNOSIS — E11.9 TYPE 2 DIABETES MELLITUS WITHOUT COMPLICATION, UNSPECIFIED WHETHER LONG TERM INSULIN USE (HCC): ICD-10-CM

## 2022-01-31 PROCEDURE — 97802 MEDICAL NUTRITION INDIV IN: CPT | Performed by: DIETITIAN, REGISTERED

## 2022-01-31 RX ORDER — VITAMIN B COMPLEX
1 CAPSULE ORAL DAILY
COMMUNITY

## 2022-01-31 RX ORDER — MAG HYDROX/ALUMINUM HYD/SIMETH 400-400-40
SUSPENSION, ORAL (FINAL DOSE FORM) ORAL DAILY
COMMUNITY

## 2022-01-31 NOTE — PATIENT INSTRUCTIONS
To qualify for a continuous glucose monitor - you have to show you are checking your Blood sugar 4x/day  And taking insulin with your meals. 1.)  Get familiar with reading the nutrition facts label by looking at serving size and total carbohydrates. - Without a label refer to your carb count guide booklet. 2.)  Measure foods for accuracy in carb counting.    3.)  Healthy carb choices:  whole grains, whole wheat pasta, starchy vegetables, fresh fruit and lowfat/non-fat milk and yogurt. 4.)  Your meal plan is found on the inside cover of your carb counting guide booklet:  1st meal or Brkf:  60 gms carbohydrates + 2-3oz protein  2nd meal or Lunch: 60-75 gms carbohydrates + 3 oz protein + non-starchy veggies  3rd meal or Dinner:  45 gms carbohydrates + 2-3 oz protein + non- starchy veggies    Snack time:  15 - 20 gms carbohydrates + 1 oz protein    5.)  Choose lean protein most of the time and Cut in 1/2 added fats to help with weight loss efforts. 6.) Bring a 1-2 week food log and meter to next dietitian appt. Thanks. Check BS:  4x/day  Fasting BS (1st thing in the morning) or before a meal (at least 4 hour since last ate), BS goal:  90 - 130  2 hours after the start of a meal, BS goal:  100 - 150    7.)  Great job with routinely walking 3x/week.

## 2022-01-31 NOTE — PROGRESS NOTES
[address] [place] [address]. [address] [place] [address] Torres., August. [facility], [address]  [phone] (phone)  [phone] (fax)    Patient Name: Jose Arce. Date of Birth: [id number]. MRN: [id number]      Assessment: Patient is a 73 y.o. male seen for Initial MNT visit for Type 2 DB.     -Nutritionally relevant labs:   Lab Results   Component Value Date/Time    LABA1C 10.9 12/13/2021 10:00 AM    LABA1C 11.0 08/26/2021 01:28 PM    LABA1C 11.3 (H) 05/27/2021 03:25 PM    GLUCOSE 283 (H) 05/27/2021 03:25 PM    GLUCOSE 268 (H) 04/17/2020 02:20 PM    GLUCOSE 129 (H) 06/20/2018 08:28 PM    GLUCOSE 197 (H) 07/02/2015 04:33 AM    CHOL 236 (H) 05/27/2021 03:25 PM    HDL 42 05/27/2021 03:25 PM    LDLCALC 166 05/27/2021 03:25 PM    TRIG 142 05/27/2021 03:25 PM     Dx 2013  Novolog on hand but instructed not to use until checking his BS before meals. Difficulty pricking his finger for samples. Hot water has helped. Megan Costa demonstrated on how to check BS. Pt appreciative.    -Blood sugar trends: Does not routinely check his BS. FBS:  185  In office FBS:  218    Pt c/o constipation. Always had - comes and goes. 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 Commutes from [place] and [facility]. 12 hour days or 2 hour days- just depends on work load. Starts work at 4 pm - until 130 am at the latest.  Usually 8 hours/day. -Food recall:   Up 1-2 pm.  Breakfast: 2 pm - egg or bologna sandwich with mustard and occ with lettuce on white bread. He is ok to eat wheat bread. Milk 2% 16 - 20 oz. Occ coffee. Lunch: 8 - 830 pm @ work - cafe - diet and gluten free options. Recently had - buffalo chicken sub 6\" lettuce, with steak fries. Coke or Pepsi. Dinner: midnight - 130 am when home  [place] with milk or flavored water.    -Main Beverages: Dislikes diet pop. Weekend drinking. Exercise - walking path. After up and then has lunch. 3x/week.     -Impression of Dietary Intake: on average, 3 meals per day, low fiber, high fat/ cholesterol, lacking routine intake of fruits and vegetables     Current Outpatient Medications on File Prior to Visit   Medication Sig Dispense Refill    b complex vitamins capsule Take 1 capsule by mouth daily Super complex      Saw [place] 450 MG CAPS Take by mouth daily      Cholecalciferol (VITAMIN D3 PO) Take by mouth daily      pioglitazone (ACTOS) 45 MG tablet take 1 tablet by mouth once daily 30 tablet 11    empagliflozin (JARDIANCE) 25 MG tablet Take 25 mg by mouth daily (Patient taking differently: Take 25 mg by mouth daily Only takes when not constipated) 30 tablet 11    fluocinonide (LIDEX) 0.05 % cream Apply topically 2 times daily. 120 g 1    blood glucose test strips (RELION PREMIER TEST) strip 1 each by In Vitro route 4 times daily As needed. 300 each 3    atorvastatin (LIPITOR) 80 MG tablet Take 1 tablet by mouth daily 90 tablet 3    losartan (COZAAR) 50 MG tablet take 1 tablet by mouth once daily 30 tablet 11    Dulaglutide (TRULICITY) 3 XH/8.4DX SOPN Inject 3 mg into the skin once a week 4 pen 5    insulin glargine (LANTUS SOLOSTAR) 100 UNIT/ML injection pen Inject 30 Units into the skin 2 times daily 15 mL 1    metFORMIN (GLUCOPHAGE) 1000 MG tablet take 1 tablet by mouth twice a day with meals 60 tablet 11    Insulin Pen Needle (MEIJER PEN NEEDLES) 31G X 8 MM MISC 1 each by Does not apply route daily 100 each 3    aspirin 81 MG tablet Take 1 tablet by mouth daily 90 tablet 3    Turmeric 500 MG CAPS Take by mouth daily        No current facility-administered medications on file prior to visit. Vitals from current and previous visits:  Temp 98.2 °F (36.8 °C)   Ht 6' 1\" (1.854 m)   Wt 274 lb (124.3 kg)   BMI 36.15 kg/m²     -Body mass index is 36.15 kg/m². 35-39.9 - Obesity Grade II.   -Weight goal: lose weight.      Nutrition Diagnosis:   Food and nutrition-related knowledge deficit related to Lack of previous MNT/currently undergoing MNT as evidenced by Conditions associated with a diagnosis or treatment: Type 2 DB and BMI 36. Intervention:  -Impression: Interested in getting a CGM. -Instructed the patient on: Carbohydrate Counting, Consistent Carbohydrate Intake, Food Label Reading, Meal Planning for Regular, Balanced Meals & Snacks and The Importance of Regular Physical Activity    -Handouts given for: carbohydrate counting, food logging, healthy snacks and sample meal plans/menus. Patient Instructions   To qualify for a continuous glucose monitor - you have to show you are checking your Blood sugar 4x/day  And taking insulin with your meals. 1.)  Get familiar with reading the nutrition facts label by looking at serving size and total carbohydrates. - Without a label refer to your carb count guide booklet. 2.)  Measure foods for accuracy in carb counting.    3.)  Healthy carb choices:  whole grains, whole wheat pasta, starchy vegetables, fresh fruit and lowfat/non-fat milk and yogurt. 4.)  Your meal plan is found on the inside cover of your carb counting guide booklet:  1st meal or Brkf:  60 gms carbohydrates + 2-3oz protein  2nd meal or Lunch: 60-75 gms carbohydrates + 3 oz protein + non-starchy veggies  3rd meal or Dinner:  45 gms carbohydrates + 2-3 oz protein + non- starchy veggies    Snack time:  15 - 20 gms carbohydrates + 1 oz protein    5.)  Choose lean protein most of the time and Cut in 1/2 added fats to help with weight loss efforts. 6.) Bring a 1-2 week food log and meter to next dietitian appt. Thanks. Check BS:  4x/day  Fasting BS (1st thing in the morning) or before a meal (at least 4 hour since last ate), BS goal:  90 - 130  2 hours after the start of a meal, BS goal:  100 - 150    7.)  Great job with routinely walking 3x/week. -Nutrition prescription: 1800 calories/day, 180 - 200 g carbs/day.    Adj wt. 207# (94 kg)    Comprehension verified using teachback method. Monitoring/Evaluation:   -Followup visit: 3 weeks with PharmD, 4 weeks with dietitian, 7 weeks with RN-CDE  -Receptiveness to education/goals: Agreeable.  -Evaluation of education: Indicates understanding.  -Readiness to change: contemplation - ambivalent about change checking BS before each meal, carb counting his food by reading the label, plate method  -Expected compliance: good. Thank you for your referral of this patient. Total time involved in direct patient education: 60 minutes for initial MNT visit.

## 2022-01-31 NOTE — TELEPHONE ENCOUNTER
----- Message from Kamille Gregory sent at 1/28/2022 12:25 PM EST -----  Subject: Message to Provider    QUESTIONS  Information for Provider? [place] would like to know what he needs to bring   to his appointment on Monday 01/31/2022 for Dr. Danny Salazar. He can be   reached at [phone]. Ok to leave a message  ---------------------------------------------------------------------------  --------------  [address]  What is the best way for the office to contact you? OK to leave message on   voicemail  Preferred Call Back Phone Number? [phone]  ---------------------------------------------------------------------------  --------------  SCRIPT ANSWERS  Relationship to Patient?  Self

## 2022-03-01 NOTE — TELEPHONE ENCOUNTER
The patient called in and is requesting a script for his pen needles be sent to [facility]. Order pended for your signature.   The patient will check with his pharmacy after 4pm.

## 2022-03-03 ENCOUNTER — TELEPHONE (OUTPATIENT)
Dept: PHARMACY | Facility: CLINIC | Age: 67
End: 2022-03-03

## 2022-03-03 NOTE — TELEPHONE ENCOUNTER
[facility] CLINICAL PHARMACY: ADHERENCE REVIEW  Identified care gap per [place]: fills at [facility]: Diabetes adherence    Last Visit: 1/13/22    ASSESSMENT  ACE/ARB ADHERENCE    Per Reconciled Dispense Report:  Losartan 50mg last filled on 8/26/21 for 90 day supply. Written #30 RR    Per [facility]:   Verified with [address] has not been refilled since August    BP Readings from Last 3 Encounters:   12/13/21 138/80   09/09/21 124/86   08/26/21 138/78     CrCl cannot be calculated (Patient's most recent lab result is older than the maximum 120 days allowed. ). DIABETES ADHERENCE    Insurance Records claims through 12.14.22 first 30 day fill 1103  Trulicity Next refill due: 2/10/22    Per Reconciled Dispense Report:   last filled on 1/13/22, 2/21/22 for 28 day supply. Lab Results   Component Value Date    LABA1C 10.9 12/13/2021    LABA1C 11.0 08/26/2021    LABA1C 11.3 (H) 05/27/2021     NOTE A1c not yet completed this calendar year    STATIN ADHERENCE    Per Reconciled Dispense Report:  Atorvastatin 80mg last filled on 9/9/21 for 90 day supply.  Written for 90DS RR    Per [facility]:   Verified with [address] has not been refilled since September    Lab Results   Component Value Date    CHOL 236 (H) 05/27/2021    TRIG 142 05/27/2021    HDL 42 05/27/2021    LDLCALC 166 05/27/2021    LDLDIRECT 178 (H) 07/02/2015     ALT   Date Value Ref Range Status   05/27/2021 22 11 - 66 U/L Final     Comment:     Performed at [facility], [address]     AST   Date Value Ref Range Status   05/27/2021 20 5 - 40 U/L Final     The 10-year ASCVD risk score (Najma Duggan et al., 2013) is: 35.8%    Values used to calculate the score:      Age: 73 years      Sex: Male      Is Non- : No      Diabetic: Yes      Tobacco smoker: No      Systolic Blood Pressure: 536 mmHg      Is BP treated: Yes      HDL Cholesterol: 42 mg/dL      Total Cholesterol: 236 mg/dL     PLAN  The following are interventions that have been identified:   - Patient overdue refilling losartan & atorvastatin and active on home medication list.     Reached patient to review. Verified medication instructions and Education provided. Unsure how he still has meds on hand. He has used an organizer in the past but flaps broke. Takes all daily in morning (including statin). Denies missing doses despite late to refill. Future Appointments   Date Time Provider Harry Dale   3/23/2022 11:00 AM Sharri Smith RN SRPX Physic [facility] - [place]rier   2022  1:00 PM Colin Dave APRN - [address]    [facility] free: [facility] Ext in place:  No   Recommendation Provided To: Patient/Caregiver: 1 via Telephone and Pharmacy: 1   Intervention Detail: Adherence Monitorin   Gap Closed?: Yes    Intervention Accepted By: Patient/Caregiver: 1   Time Spent (min): 10

## 2022-03-23 ENCOUNTER — OFFICE VISIT (OUTPATIENT)
Dept: INTERNAL MEDICINE CLINIC | Age: 67
End: 2022-03-23
Payer: COMMERCIAL

## 2022-03-23 VITALS
HEIGHT: 73 IN | TEMPERATURE: 97.2 F | BODY MASS INDEX: 36.31 KG/M2 | SYSTOLIC BLOOD PRESSURE: 122 MMHG | DIASTOLIC BLOOD PRESSURE: 88 MMHG | WEIGHT: 274 LBS | HEART RATE: 94 BPM

## 2022-03-23 DIAGNOSIS — E11.9 CONTROLLED TYPE 2 DIABETES MELLITUS WITHOUT COMPLICATION, WITHOUT LONG-TERM CURRENT USE OF INSULIN (HCC): ICD-10-CM

## 2022-03-23 PROCEDURE — 95249 CONT GLUC MNTR PT PROV EQP: CPT | Performed by: INTERNAL MEDICINE

## 2022-03-23 PROCEDURE — G0108 DIAB MANAGE TRN  PER INDIV: HCPCS | Performed by: INTERNAL MEDICINE

## 2022-03-23 RX ORDER — PSYLLIUM SEED (WITH DEXTROSE)
3.4 POWDER (GRAM) ORAL DAILY
COMMUNITY

## 2022-03-23 RX ORDER — INSULIN ASPART 100 [IU]/ML
10 INJECTION, SOLUTION INTRAVENOUS; SUBCUTANEOUS
COMMUNITY
End: 2022-09-14

## 2022-03-23 NOTE — PROGRESS NOTES
Diabetes Mellitus Type II, Initial Visit:   GUEVARA Youssef CNP  Patient here for an initial evaluation of Type 2 diabetes mellitus. Current symptoms/problems include none. The patient was initially diagnosed with Type 2 diabetes mellitus:  2013. Known diabetic complications: retinopathy  Cardiovascular risk factors: advanced age (older than 54 for men, 72 for women), diabetes mellitus, dyslipidemia, hypertension, male gender and obesity (BMI >= 30 kg/m2)  Current diabetic medications include Trulicity, Jardiance, Actos, Lantus, Novolog, Metformin. Eye exam current (within one year): yes 12/2/2021 [place]. Hx retinopathy/ new cataract  Weight trend: fluctuating a bit  Prior visit with dietician: yes - 1/31/2022  Current diet: B 1-2pm apple or banana or cereal 1 cup Honey bunches of oats. 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 \"most readings are over 200\"  Any episodes of hypoglycemia? yes - over a year ago          Woozy/ sweaty -tx  juice  Is He on ACE inhibitor or angiotensin II receptor blocker? Yes   losartan (Cozaar)    Focus  Initial visit for Diabetes education. A1C from 12/13/21 10.9%. [facility] has not recently checked his blood sugar, but last readings were over 200. He has seen the dietician and made some changes in his meal planning. He still drinks 1 regular pop per day and pedialyte with carbs in it.  He agrees to stop both. He is interested in the CGM so that he can safely take his meal time insulin that was ordered. He was started on a Libre2 sample sensor today and request will be submitted for the Libre2 sensors. He is receptive to this. Reasonable exercise is in place. Weight is stable. Much encouragement given. Follow up 2 weeks. Plan  Reviewed physiology of Diabetes; carbs; exercise; CGM; bolus insulin dosing; treating low BS  3 meals per day; 3-4 servings of carbohydrate at each meal            15-20 grams carbs  For snack  Keep walking 5-6 days per week  Check blood sugars 4 times per day. Before meals and bedtime  Start taking your Novolog --start with 5  Units with each                     --increase to to the 10 units if  Your blood sugars aren't                              Under 150  Always carry treatment for low blood sugar--glucose tablets, sweettarts,                   Smarties, juice or reg pop  No more regular pop or pedialyte with carbs             *drink 0 carb drinks except your milk--limit milk 8 oz per serving  Meter download, medications, PMH and nursing assessment reviewed. Baron Silva states He is willing to participate in this plan of care and verbalized understanding of all instructions provided. Teach back used to verify comprehension. Total time involved in direct patient education: 60 minutes.

## 2022-03-23 NOTE — PROGRESS NOTES
Rod 2 sensor inserted on back of Upper Right Arm. Sensor initiated via [employer] 2 . Reviewed interstitial glucose versus capillary glucose. Instructed on  setting and set up the appropriate alarms. Time and date set on   80 for Low Alarm   250 for High Alarm. Explained the arrows and the meaning of their blood sugar. Patient instructed the system is water resistant and can shower. To reinforce sensor with tape as needed if it begins to peel away from the skin. Encouraged Cecilia Stevens to call [facility]le or the Diabetes Center with any questions. Cecilia Stevens voiced understanding of Jacqulynn Candle 2 instructions via teach back.

## 2022-03-24 RX ORDER — INSULIN GLARGINE 100 [IU]/ML
INJECTION, SOLUTION SUBCUTANEOUS
Qty: 15 ML | Refills: 1 | Status: SHIPPED | OUTPATIENT
Start: 2022-03-24 | End: 2022-09-15 | Stop reason: SDUPTHER

## 2022-03-30 ENCOUNTER — TELEPHONE (OUTPATIENT)
Dept: PHARMACY | Facility: CLINIC | Age: 67
End: 2022-03-30

## 2022-03-30 NOTE — TELEPHONE ENCOUNTER
[facility] CLINICAL PHARMACY: STATIN THERAPY REVIEW  Identified statin use in persons with diabetes care gap per [place]. Records dated: 3/7/22. Last Office Visit: 12/13/21 Keely Jaramillo 3/23/22 [place]    Patient also appears to be taking: insulin, pioglitazone, Jardiance, losartan, Trulicity, and metformin    ASSESSMENT  ACE/ARB ADHERENCE    Per Reconciled Dispense Report:  Losartan 50mg last filled on 3/23/22 for 90 day supply. Per [address]:   Losartan 50mg last picked up on 3/23/22 for 90 day supply. Billed through [facility]     BP Readings from Last 3 Encounters:   03/23/22 122/88   12/13/21 138/80   09/09/21 124/86     CrCl cannot be calculated (Patient's most recent lab result is older than the maximum 120 days allowed. ). Lab Results   Component Value Date    LABA1C 10.9 12/13/2021    LABA1C 11.0 08/26/2021    LABA1C 11.3 (H) 05/27/2021     NOTE A1c >9%    STATIN GAP IDENTIFIED    Per chart review, patient is prescribed atorvastatin 80mg    Per [address]:   Atorvastatin 80mg last filled on 3/23/22 for a 90 day supply;  last picked up on 3/23/22.  Billed through [facility] Results   Component Value Date    CHOL 236 (H) 05/27/2021    TRIG 142 05/27/2021    HDL 42 05/27/2021    LDLCALC 166 05/27/2021    LDLDIRECT 178 (H) 07/02/2015     ALT   Date Value Ref Range Status   05/27/2021 22 11 - 66 U/L Final     Comment:     Performed at [facility], [address]     AST   Date Value Ref Range Status   05/27/2021 20 5 - 40 U/L Final       The 10-year ASCVD risk score (Anna Beckett et al., 2013) is: 29.9%    Values used to calculate the score:      Age: 73 years      Sex: Male      Is Non- : No      Diabetic: Yes      Tobacco smoker: No      Systolic Blood Pressure: 950 mmHg      Is BP treated: Yes      HDL Cholesterol: 42 mg/dL      Total Cholesterol: 236 mg/dL     Hyperlipidemia Goal: Patient is prescribed high-intensity statin therapy. 2021 ADA Guidelines Age:    [facility]  >/= 36years old:   o History of ASCVD or 10-year ASCVD risk > 20% - high-intensity statin is recommended. PLAN    No patient out reach planned at this time.     Future Appointments   Date Time Provider Harry Suyapa   4/6/2022  3:00 PM Asiya Ledesma, RD, LD SRPX Physic [facility] - [facility] [place] [facility]   4/13/2022  1:00 PM NILE Polo   4/13/2022  4:00 PM Dayami Low RN SRPX Physic [facility] - [address] // Department, toll free [phone], Option 1     =======================================================  For Pharmacy Admin Tracking Only     Gap Closed?: Yes    Time Spent (min): 15

## 2022-04-06 ENCOUNTER — OFFICE VISIT (OUTPATIENT)
Dept: INTERNAL MEDICINE CLINIC | Age: 67
End: 2022-04-06
Payer: COMMERCIAL

## 2022-04-06 VITALS — WEIGHT: 274 LBS | HEIGHT: 73 IN | TEMPERATURE: 98.1 F | BODY MASS INDEX: 36.31 KG/M2

## 2022-04-06 DIAGNOSIS — E11.65 UNCONTROLLED TYPE 2 DIABETES MELLITUS WITH HYPERGLYCEMIA (HCC): ICD-10-CM

## 2022-04-06 PROCEDURE — 97803 MED NUTRITION INDIV SUBSEQ: CPT | Performed by: DIETITIAN, REGISTERED

## 2022-04-06 NOTE — PATIENT INSTRUCTIONS
Get your Burgers and fish without cheese. Limit french fries or avoid when eating a sandwich. Work in your carrots or other veggies for snacking and make them with meals. Choose baked dinners instead of fried foods. - Choose lean lunch meat in place of bologna or dutchloaf or conley or sausage. Continue using the high fiber bread. Measure your cereal in the morning. Stay within 45-60 gms carbs  - add protein - 1 egg, 2 TB nuts. Bring a 1 week food log again to next dietitian appt.

## 2022-04-06 NOTE — PROGRESS NOTES
[address] [place] [address]. [address] [place] [address] Torres., AugustSandra [facility]oC[facility], [address]  [phone] (phone)  [phone] (fax)    Patient Name: Yun Shabazz. Date of Birth: [id number]. MRN: [id number]      Assessment: Patient is a 73 y.o. male seen for follow-up MNT visit for Type 2 DB.     -Nutritionally relevant labs:   Lab Results   Component Value Date/Time    LABA1C 10.9 12/13/2021 10:00 AM    LABA1C 11.0 08/26/2021 01:28 PM    LABA1C 11.3 (H) 05/27/2021 03:25 PM    GLUCOSE 283 (H) 05/27/2021 03:25 PM    GLUCOSE 268 (H) 04/17/2020 02:20 PM    GLUCOSE 129 (H) 06/20/2018 08:28 PM    GLUCOSE 197 (H) 07/02/2015 04:33 AM    CHOL 236 (H) 05/27/2021 03:25 PM    HDL 42 05/27/2021 03:25 PM    LDLCALC 166 05/27/2021 03:25 PM    TRIG 142 05/27/2021 03:25 PM     -Blood sugar trends: Rod 2 CGM - new to him and more sensors are ordered. Downloaded and reviewed past 2 weeks. Presented download to  cneter Pharmacist.  Time in range:  36%,   High:  31%  Very High:33%  Low: 0%    Pt states he has candy to treat low BS but will sometimes eat this without low BS's  Pt no longer works second shift.  -Food recall/Food logs:   Breakfast: 10 - 11 am - Captain crunch cereal & Bologna and bun sandwich OR Honey Nut Cheerios and Old Fashioned [place] loaf meat sandwich (bun) OR just Captain Crunk cereal OR Taco Bell Chicken Quesadilla OR 2 large eggs and 1 sl toast with butter. Lunch: 2-4 pm - Julianne Fried Chicken OR Arby's Fish sandwich, medium crinkle cut fries OR [employer], small [address] Drink OR Clarance Ping - 6 oz Sirloin, hashbrowns, 2 medium eggs OR toasted cheese sandwich made with swiss cheese. Dinner: Bologna sandwich and Blueberry pop tart OR 1 can chicken noodles soup, cheese sandwich OR 1 can Beefaroni, 1 sl bologna OR cheeseburger and pork and beans. Snacks: Blueberry bagel and cranapple juice OR 2 mini ressee cups OR lays potato chips OR     -Main Beverages:  Occ \"Iced sugar free punch, occ alcohol - 2 rum and cokes. -Impression of Dietary Intake: 3 meals/day, 3-4 fast food meals/week, lacking fresh fruits and vegetables and high fat high cholesterol. Current Outpatient Medications on File Prior to Visit   Medication Sig Dispense Refill    LANTUS SOLOSTAR 100 UNIT/ML injection pen INJECT 30 UNITS SUBCUTANEOUSLY TWICE A DAY 15 mL 1    psyllium (METAMUCIL) 28.3 % POWD powder Take 3.4 g by mouth daily      insulin aspart (NOVOLOG FLEXPEN) 100 UNIT/ML injection pen Inject 10 Units into the skin 3 times daily (before meals)      Insulin Pen Needle 31G X 8 MM MISC 1 each by Does not apply route daily 100 each 3    b complex vitamins capsule Take 1 capsule by mouth daily Super complex      Saw [place] 450 MG CAPS Take by mouth daily      Cholecalciferol (VITAMIN D3 PO) Take by mouth daily      pioglitazone (ACTOS) 45 MG tablet take 1 tablet by mouth once daily 30 tablet 11    empagliflozin (JARDIANCE) 25 MG tablet Take 25 mg by mouth daily (Patient taking differently: Take 25 mg by mouth daily Only takes when not constipated) 30 tablet 11    blood glucose test strips (RELION PREMIER TEST) strip 1 each by In Vitro route 4 times daily As needed. 300 each 3    atorvastatin (LIPITOR) 80 MG tablet Take 1 tablet by mouth daily 90 tablet 3    losartan (COZAAR) 50 MG tablet take 1 tablet by mouth once daily 30 tablet 11    Dulaglutide (TRULICITY) 3 XK/3.8OJ SOPN Inject 3 mg into the skin once a week 4 pen 5    metFORMIN (GLUCOPHAGE) 1000 MG tablet take 1 tablet by mouth twice a day with meals 60 tablet 11    Insulin Pen Needle (MEIJER PEN NEEDLES) 31G X 8 MM MISC 1 each by Does not apply route daily 100 each 3    aspirin 81 MG tablet Take 1 tablet by mouth daily 90 tablet 3    Turmeric 500 MG CAPS Take by mouth daily       fluocinonide (LIDEX) 0.05 % cream Apply topically 2 times daily.  (Patient not taking: Reported on 4/6/2022) 120 g 1     No current facility-administered medications on file prior to visit. Vitals from current and previous visits:  Temp 98.1 °F (36.7 °C)   Ht 6' 1\" (1.854 m)   Wt 274 lb (124.3 kg)   BMI 36.15 kg/m²     -Body mass index is 36.15 kg/m². 35-39.9 - Obesity Grade II.   - Patient maintained.  -Weight goal: lose weight. Nutrition Diagnosis:   Food and nutrition-related knowledge deficit & obesity related to currently undergoing MNT as evidenced by Conditions associated with a diagnosis or treatment: Type 2 DB  And food recall indicative of high fat and cholesterol food choices. Intervention:  -Instructed the patient on: Carbohydrate Counting, Consistent Carbohydrate Intake, Meal Planning for Regular, Balanced Meals & Snacks, The Importance of Regular Physical Activity and label reading, low fat food chioces,  not to drink regular pop or juice and drink water most of the time and sugar free drinks. -Handouts given for: revised personal meal plan (45 gms/45-60/45-60/meal). Patient Instructions   Get your Burgers and fish without cheese. Limit french fries or avoid when eating a sandwich. Work in your carrots or other veggies for snacking and make them with meals. Choose baked dinners instead of fried foods. - Choose lean lunch meat in place of bologna or dutchloaf or conley or sausage. Continue using the high fiber bread. Measure your cereal in the morning. Stay within 45-60 gms carbs  - add protein - 1 egg, 2 TB nuts. Bring a 1 week food log again to next dietitian appt. -Nutrition prescription: 1800 calories/day, 180 - 200 g carbs/day. Adj wt. 207# (94 kg)    Comprehension verified using teachback method.     Monitoring/Evaluation:   -Followup visit: 3 weeks with Phamd, 2 mo with Dietitian.   -Receptiveness to education/goals: Agreeable.  -Evaluation of education: Needs reinforcement.  -Readiness to change: precontemplative- choosing lower fat food choices when eating out and when buying food items form the grocery store. -Expected compliance: good. Thank you for your referral of this patient. Total time involved in direct patient education: 60 minutes for follow-up MNT visit.

## 2022-04-12 ENCOUNTER — TELEPHONE (OUTPATIENT)
Dept: INTERNAL MEDICINE CLINIC | Age: 67
End: 2022-04-12

## 2022-04-12 ENCOUNTER — HOSPITAL ENCOUNTER (OUTPATIENT)
Age: 67
Discharge: HOME | End: 2022-04-12
Payer: MEDICARE

## 2022-04-12 DIAGNOSIS — E78.2 MIXED HYPERLIPIDEMIA: ICD-10-CM

## 2022-04-12 DIAGNOSIS — E11.65 UNCONTROLLED TYPE 2 DIABETES MELLITUS WITH HYPERGLYCEMIA (HCC): ICD-10-CM

## 2022-04-12 LAB
ALBUMIN SERPL-MCNC: 3.7 G/DL (ref 3.5–5.1)
ALP BLD-CCNC: 96 U/L (ref 38–126)
ALT SERPL-CCNC: 28 U/L (ref 11–66)
ANION GAP SERPL CALCULATED.3IONS-SCNC: 12 MEQ/L (ref 8–16)
AST SERPL-CCNC: 29 U/L (ref 5–40)
AVERAGE GLUCOSE: 237 MG/DL (ref 70–126)
BILIRUB SERPL-MCNC: 0.3 MG/DL (ref 0.3–1.2)
BUN BLDV-MCNC: 11 MG/DL (ref 7–22)
CALCIUM SERPL-MCNC: 9 MG/DL (ref 8.5–10.5)
CHLORIDE BLD-SCNC: 102 MEQ/L (ref 98–111)
CHOLESTEROL, TOTAL: 145 MG/DL (ref 100–199)
CO2: 25 MEQ/L (ref 23–33)
CREAT SERPL-MCNC: 1 MG/DL (ref 0.4–1.2)
CREATININE, URINE: 123.8 MG/DL
ERYTHROCYTE [DISTWIDTH] IN BLOOD BY AUTOMATED COUNT: 12.4 % (ref 11.5–14.5)
ERYTHROCYTE [DISTWIDTH] IN BLOOD BY AUTOMATED COUNT: 41.3 FL (ref 35–45)
GFR SERPL CREATININE-BSD FRML MDRD: 75 ML/MIN/1.73M2
GLUCOSE BLD-MCNC: 113 MG/DL (ref 70–108)
HBA1C MFR BLD: 9.9 % (ref 4.4–6.4)
HCT VFR BLD CALC: 48.4 % (ref 42–52)
HDLC SERPL-MCNC: 43 MG/DL
HEMOGLOBIN: 15.8 GM/DL (ref 14–18)
LDL CHOLESTEROL CALCULATED: 87 MG/DL
MCH RBC QN AUTO: 30 PG (ref 26–33)
MCHC RBC AUTO-ENTMCNC: 32.6 GM/DL (ref 32.2–35.5)
MCV RBC AUTO: 91.8 FL (ref 80–94)
MICROALBUMIN UR-MCNC: < 1.2 MG/DL
MICROALBUMIN/CREAT UR-RTO: 10 MG/G (ref 0–30)
PLATELET # BLD: 346 THOU/MM3 (ref 130–400)
PMV BLD AUTO: 10 FL (ref 9.4–12.4)
POTASSIUM SERPL-SCNC: 4.4 MEQ/L (ref 3.5–5.2)
RBC # BLD: 5.27 MILL/MM3 (ref 4.7–6.1)
SODIUM BLD-SCNC: 139 MEQ/L (ref 135–145)
TOTAL PROTEIN: 7.5 G/DL (ref 6.1–8)
TRIGL SERPL-MCNC: 76 MG/DL (ref 0–199)
TSH SERPL DL<=0.05 MIU/L-ACNC: 3.08 UIU/ML (ref 0.4–4.2)
WBC # BLD: 7.1 THOU/MM3 (ref 4.8–10.8)

## 2022-04-12 PROCEDURE — 80061 LIPID PANEL: CPT

## 2022-04-12 PROCEDURE — 82043 UR ALBUMIN QUANTITATIVE: CPT

## 2022-04-12 PROCEDURE — 36415 COLL VENOUS BLD VENIPUNCTURE: CPT

## 2022-04-12 PROCEDURE — 80053 COMPREHEN METABOLIC PANEL: CPT

## 2022-04-12 PROCEDURE — 85027 COMPLETE CBC AUTOMATED: CPT

## 2022-04-12 PROCEDURE — 83036 HEMOGLOBIN GLYCOSYLATED A1C: CPT

## 2022-04-12 PROCEDURE — 84443 ASSAY THYROID STIM HORMONE: CPT

## 2022-04-12 NOTE — TELEPHONE ENCOUNTER
Sent order for [employer] to [facility] through [employer] [facility]).

## 2022-04-13 ENCOUNTER — NURSE ONLY (OUTPATIENT)
Dept: INTERNAL MEDICINE CLINIC | Age: 67
End: 2022-04-13
Payer: MEDICARE

## 2022-04-13 ENCOUNTER — OFFICE VISIT (OUTPATIENT)
Dept: FAMILY MEDICINE CLINIC | Age: 67
End: 2022-04-13
Payer: MEDICARE

## 2022-04-13 VITALS
RESPIRATION RATE: 18 BRPM | DIASTOLIC BLOOD PRESSURE: 62 MMHG | BODY MASS INDEX: 36.59 KG/M2 | SYSTOLIC BLOOD PRESSURE: 134 MMHG | HEART RATE: 100 BPM | WEIGHT: 277.3 LBS

## 2022-04-13 DIAGNOSIS — Z86.73 HISTORY OF CVA (CEREBROVASCULAR ACCIDENT): ICD-10-CM

## 2022-04-13 DIAGNOSIS — E78.2 MIXED HYPERLIPIDEMIA: ICD-10-CM

## 2022-04-13 DIAGNOSIS — E11.65 UNCONTROLLED TYPE 2 DIABETES MELLITUS WITH HYPERGLYCEMIA (HCC): Primary | ICD-10-CM

## 2022-04-13 DIAGNOSIS — I10 ESSENTIAL HYPERTENSION: ICD-10-CM

## 2022-04-13 DIAGNOSIS — Z23 NEED FOR 23-POLYVALENT PNEUMOCOCCAL POLYSACCHARIDE VACCINE: ICD-10-CM

## 2022-04-13 DIAGNOSIS — E11.9 TYPE 2 DIABETES MELLITUS WITHOUT COMPLICATION, WITHOUT LONG-TERM CURRENT USE OF INSULIN (HCC): ICD-10-CM

## 2022-04-13 DIAGNOSIS — E66.01 SEVERE OBESITY (BMI 35.0-35.9 WITH COMORBIDITY) (HCC): ICD-10-CM

## 2022-04-13 DIAGNOSIS — E66.9 OBESITY (BMI 30-39.9): ICD-10-CM

## 2022-04-13 PROCEDURE — G0109 DIAB MANAGE TRN IND/GROUP: HCPCS | Performed by: INTERNAL MEDICINE

## 2022-04-13 PROCEDURE — 90732 PPSV23 VACC 2 YRS+ SUBQ/IM: CPT | Performed by: NURSE PRACTITIONER

## 2022-04-13 PROCEDURE — 3046F HEMOGLOBIN A1C LEVEL >9.0%: CPT | Performed by: NURSE PRACTITIONER

## 2022-04-13 PROCEDURE — 99214 OFFICE O/P EST MOD 30 MIN: CPT | Performed by: NURSE PRACTITIONER

## 2022-04-13 PROCEDURE — G0009 ADMIN PNEUMOCOCCAL VACCINE: HCPCS | Performed by: NURSE PRACTITIONER

## 2022-04-13 ASSESSMENT — ENCOUNTER SYMPTOMS
SHORTNESS OF BREATH: 0
CONSTIPATION: 0

## 2022-04-13 NOTE — PROGRESS NOTES
Subjective:      Patient ID: Ted Tavares is a 73 y.o. male. HPI: 3 month Follow Up    Chief Complaint   Patient presents with    Follow-up     4 months    Health Maintenance     needs a pneumonia vaccine    Discuss Labs       He is to be on Metformin 1000 mg BID, Jardiance 25 mg,  Actos 45 mg and Trulcity 3mg. On Lantus 30 units BID and Novolog 10 u TID    A1C improved. Following with [facility]. Has CGM and is taking his insulin now since he is able to check his BS    Lab Results   Component Value Date    LABA1C 9.9 (H) 04/12/2022     Lab Results   Component Value Date     07/02/2015       Patient Active Problem List   Diagnosis    Controlled type 2 diabetes mellitus without complication, without long-term current use of insulin ([address]    Essential hypertension    History of CVA (cerebrovascular accident)    Mixed hyperlipidemia    Obesity (BMI 30-39. 9)       COLONOSCOPY - 2016    POD - Dr. Quique Horne    Overall doing well. Denies CP, SOB or chest tightness. No smoking hx. BP Readings from Last 3 Encounters:   04/13/22 134/62   03/23/22 122/88   12/13/21 138/80       BP wnl. Losartan 50 mg. Wt Readings from Last 3 Encounters:   04/13/22 277 lb 4.8 oz (125.8 kg)   04/06/22 274 lb (124.3 kg)   03/23/22 274 lb (124.3 kg)       Labs reviewed. Lipitor 80 mg. Tolerating well.      No components found for: CHLPL  Lab Results   Component Value Date    TRIG 76 04/12/2022    TRIG 142 05/27/2021    TRIG 139 04/17/2020     Lab Results   Component Value Date    HDL 43 04/12/2022    HDL 42 05/27/2021    HDL 46 04/17/2020     Lab Results   Component Value Date    LDLCALC 87 04/12/2022    LDLCALC 166 05/27/2021    LDLCALC 185 04/17/2020     No results found for: LABVLDL      Chemistry        Component Value Date/Time     04/12/2022 1416    K 4.4 04/12/2022 1416     04/12/2022 1416    CO2 25 04/12/2022 1416    BUN 11 04/12/2022 1416    CREATININE 1.0 04/12/2022 1416        Component Value Date/Time    CALCIUM 9.0 04/12/2022 1416    ALKPHOS 96 04/12/2022 1416    AST 29 04/12/2022 1416    ALT 28 04/12/2022 1416    BILITOT 0.3 04/12/2022 1416          Lab Results   Component Value Date    TSH 3.080 04/12/2022       Lab Results   Component Value Date    WBC 7.1 04/12/2022    HGB 15.8 04/12/2022    HCT 48.4 04/12/2022    MCV 91.8 04/12/2022     04/12/2022       Health Maintenance   Topic Date Due    HIV screen  Never done    Shingles Vaccine (1 of 2) Never done    Pneumococcal 65+ years Vaccine (2 - PCV) 01/11/2018    A1C test (Diabetic or Prediabetic)  07/12/2022    Depression Screen  08/26/2022    Annual Wellness Visit (AWV)  08/27/2022    Flu vaccine (Season Ended) 09/01/2022    Diabetic retinal exam  12/02/2022    Diabetic microalbuminuria test  04/12/2023    Lipid screen  04/12/2023    Potassium monitoring  04/12/2023    Creatinine monitoring  04/12/2023    Diabetic foot exam  04/13/2023    Colorectal Cancer Screen  01/09/2027    DTaP/Tdap/Td vaccine (2 - Td or Tdap) 01/11/2027    COVID-19 Vaccine  Completed    Hepatitis C screen  Completed    Hepatitis A vaccine  Aged Out    Hib vaccine  Aged Out    Meningococcal (ACWY) vaccine  Aged [employer] History   Administered Date(s) Administered    COVID-19, J&J, PF, 0.5 mL 09/01/2021    COVID-19, [employer] top, DO NOT Dilute, Isra-Sucrose, 12+ yrs, PF, 30 mcg/0.3 mL dose 01/12/2022    Influenza, Quadv, IM, (6 mo and older Fluzone, Flulaval, Fluarix and 3 yrs and older Afluria) 01/11/2017, 10/18/2018    Pneumococcal Polysaccharide (Expanftwt18) 01/11/2017    Tdap (Boostrix, Adacel) 01/11/2017       Review of Systems   HENT: Negative. Respiratory: Negative for shortness of breath. Gastrointestinal: Negative for constipation. Genitourinary: Negative for dysuria and frequency. Skin: Negative for rash. Neurological: Negative for light-headedness. Psychiatric/Behavioral: Negative. Objective:   Physical Exam  Constitutional:       General: He is not in acute distress. Eyes:      Pupils: Pupils are equal, round, and reactive to light. Cardiovascular:      Rate and Rhythm: Normal rate and regular rhythm. Heart sounds: No murmur heard. Pulmonary:      Effort: Pulmonary effort is normal.      Breath sounds: Normal breath sounds. No wheezing. Abdominal:      General: Bowel sounds are normal. There is no distension. Palpations: Abdomen is soft. Tenderness: There is no abdominal tenderness. Musculoskeletal:         General: No tenderness. Normal range of motion. Cervical back: Normal range of motion and neck supple. Skin:     General: Skin is warm and dry. Findings: No rash. Assessment:       Diagnosis Orders   1. Uncontrolled type 2 diabetes mellitus with hyperglycemia (HCC)  HM DIABETES FOOT EXAM    Hemoglobin Q8X    Basic Metabolic Panel   2. Mixed hyperlipidemia     3. Essential hypertension     4. Obesity (BMI 30-39.9)     5. Severe obesity (BMI 35.0-35.9 with comorbidity) ([address]     6. History of CVA (cerebrovascular accident)     7. Need for 23-polyvalent pneumococcal polysaccharide vaccine  PNEUMOVAX 23 subcutaneous/IM (Pneumococcal polysaccharide vaccine 23-valent >= 3yo)           Plan:       DM Clinic  Chronic conditions except DM  Labs reviewed  DM Clinic to manage dosing of insulin  Refills as above  Diet and exercise stressed  Immunizations discussed - [id number] in office  Labs in 6 months  RTO in 6 months      Melina Lunch received counseling on the following healthy behaviors: nutrition and exercise  Reviewed prior labs and health maintenance  Continue current medications, diet and exercise. Discussed use, benefit, and side effects of prescribed medications. Barriers to medication compliance addressed. Patient given educational materials - see patient instructions  Was a self-tracking handout given in paper form or via [employer]?  No: Requested Prescriptions      No prescriptions requested or ordered in this encounter       All patient questions answered. Patient voiced understanding. Quality Measures    Body mass index is 36.59 kg/m². Elevated. Weight control planned discussed Healthy diet and regular exercise. BP: 134/62 Blood pressure is normal. Treatment plan consists of No treatment change needed.     Lab Results   Component Value Date    LDLCALC 87 04/12/2022    LDLDIRECT 178 (H) 07/02/2015    (goal LDL reduction with dx if diabetes is 50% LDL reduction)      PHQ Scores 8/26/2021 1/26/2021 4/21/2020 4/24/2019 1/19/2018 1/11/2017   PHQ2 Score 0 0 0 0 0 0   PHQ9 Score 0 0 0 0 0 0     Interpretation of Total Score Depression Severity: 1-4 = Minimal depression, 5-9 = Mild depression, 10-14 = Moderate depression, 15-19 = Moderately severe depression, 20-27 = Severe depression

## 2022-04-13 NOTE — PROGRESS NOTES
Patient presented for the [facility]  education class. The content was presented via PowerPoint, lecture and case-based format covering the following concepts: 60mins education. · What is Diabetes? · Define glycosolation  · Interpretation of the A1C and goal.  · Pancreatic function  · Target organs and macro and microvascular complications  · Goals for SGM BP goals  · Meal clearance   · S&S hyper/hypoglycemia and tx   · Insulin physiology-basal/bolus  · Navigating sick days stress  · Relationship between diet, exercise, meds and stress  ·  Utilizing stella care system at appropriate time  · Assuming responsibility in self management  · Troubleshooting patterns      Post test score 16 of 16.

## 2022-04-13 NOTE — PROGRESS NOTES
Immunizations Administered     Name Date Dose Route    Pneumococcal Polysaccharide ([id number]) 4/13/2022 0.5 mL Intramuscular    Site: Deltoid- Left    Lot: [id number]    NDC: 2566-8705-04            After obtaining consent, and per orders of Shyam Cornejo CNP, injection of [id number] 0.5ml given IM in Left deltoid by Starla Carballo LPN. Patient instructed to report any adverse reaction to me immediately. Patient tolerated well and without incident. VIS given to the patient, ABN signed.

## 2022-05-02 ENCOUNTER — PHARMACY VISIT (OUTPATIENT)
Dept: INTERNAL MEDICINE CLINIC | Age: 67
End: 2022-05-02
Payer: MEDICARE

## 2022-05-02 DIAGNOSIS — E11.9 CONTROLLED TYPE 2 DIABETES MELLITUS WITHOUT COMPLICATION, WITHOUT LONG-TERM CURRENT USE OF INSULIN (HCC): Primary | ICD-10-CM

## 2022-05-02 PROCEDURE — G0108 DIAB MANAGE TRN  PER INDIV: HCPCS | Performed by: INTERNAL MEDICINE

## 2022-05-02 NOTE — PATIENT INSTRUCTIONS
1. Aim to do 15-20 minutes of weight lifting/core work 5 days per week (in addition to walking)    2. Give us a call to schedule a training when your Jerome 2 sensors arrive: [phone]   -We will use the [employer] 2 information to adjust insulin doses    3.   Feel free stop by for a meter download any day Mo-Th 8am-4:30pm

## 2022-05-02 NOTE — PROGRESS NOTES
[facility]  [address] [address] [facility], [address]  [phone] (phone)  [phone] (fax)    Patient ID: Jerome Brown [id number]  Referring Provider: Richar Mancuso CNP     Patient's name and  were verified. Subjective:    He presents for His follow-up diabetic visit. He has type 2 diabetes mellitus. Home regimen includes: Insulin, Biguanide, TZD, GLP-1 Agonist, and SGLT-2 inhibitors He is noncompliant some of the time. Assessment:     Lab Results   Component Value Date    LABA1C 9.9 2022    BUN 11 2022    CREATININE 1.0 2022     There were no vitals filed for this visit. Wt Readings from Last 3 Encounters:   22 277 lb 4.8 oz (125.8 kg)   22 274 lb (124.3 kg)   22 274 lb (124.3 kg)     Ht Readings from Last 3 Encounters:   22 6' 1\" (1.854 m)   22 6' 1\" (1.854 m)   22 6' 1\" (1.854 m)       Diabetes Pharmacotherapy:  Trulicity 3mg weekly  Jardiance 25mg daily  Novolog 10 units 3x daily  Lantus 30 units BID   Metformin 1000mg BID  Piogitazone 45mg daily    Glucose Trends:   Current monitoring regimen: Fingerstick blood tests - 1-2 times daily, Rod 2 en route  Home blood sugar trends: no glucometer today, readings per pt recall   -Fasting AM: 108, 210, normally 120s-160s   -Bedtime: 140s-150s  Any episodes of hypoglycemia? yes - once in the past month (78) before the meal   -Treats with mini Rubén's    Lifestyle Factors:   Previous visit with dietician: yes - 3/2022  Current diet: did not review in depth                       Beverages: mostly water (w/ flavor/electrolyte packets);  Ice drinks  Current exercise:some walking    Health Maintenance:  Eye exam current (within one year): yes 2021 Nina Jackson. Hx retinopathy/ new cataract  Any history of foot problems? no  Foot exam up to date: yes Date: 2022 w/ Dr. Carrie Car up to date:    Pneumonia: yes   COVID-19: yes  Last panel - LDL:   Lab Results   Component Value Date    LDLCALC 87 04/12/2022   Taking ASA:  Yes   Taking statin: Yes-Lipitor (atorvastatin)  Last microalbumin/creatinine ratio: 10 (4/12/22)  Taking ACE/ARB: Yes-losartan    Focus:   Diabetes Education. A1C: 9.9% (4/12/22). Self-reported readings appear to be at/near goal with periodic excursions. [employer] 2 system is being sent to patient- this will provide additional insight. Jas Walker is tolerating Trulicity 3mg fairly well, but is concerned with increasing the dose due to mild acid reflux at night. Discussed methods to increase physical activity. DSME PLAN:   Discussed general issues about diabetes pathophysiology and management. Counseling at today's visit: reminded to check sugars regularly and to bring readings in at the time of the next visit. Encouraged aerobic exercise. 1. Aim to do 15-20 minutes of weight lifting/core work 5 days per week (in addition to walking)    2. Give us a call to schedule a training when your [employer] 2 sensors arrive: [phone]   -We will use the [employer] 2 information to adjust insulin doses    3. Feel free stop by for a meter download any day Mo-Th 8am-4:30pm    Meter download, medications, PMH and nursing assessment reviewed. Marek Kelly states He is willing to participate in this plan of care and verbalized understanding of all instructions provided. Teach back used to verify comprehension. Follow-up: ~2 weeks for [employer] 2 training (if needed), 2.5 months for DM Clinic RN     Total time involved in direct patient education: 30 minutes.      For [employer] [employer] [place] in place:  No  Time Spent (min): Kamini Gonzalez, [facility]  Internal Medicine Clinical Pharmacist  [phone]

## 2022-05-10 ENCOUNTER — TELEPHONE (OUTPATIENT)
Dept: INTERNAL MEDICINE CLINIC | Age: 67
End: 2022-05-10

## 2022-05-10 DIAGNOSIS — E11.65 UNCONTROLLED TYPE 2 DIABETES MELLITUS WITH HYPERGLYCEMIA (HCC): Primary | ICD-10-CM

## 2022-05-10 RX ORDER — FLASH GLUCOSE SENSOR
KIT MISCELLANEOUS
Qty: 9 EACH | Refills: 3 | Status: SHIPPED | OUTPATIENT
Start: 2022-05-10 | End: 2022-09-15 | Stop reason: SDUPTHER

## 2022-05-10 NOTE — TELEPHONE ENCOUNTER
Patient is in need of [address] 2 Sensors for his CGM. I have tried multiple DME's with no success with coverage. We would like to try his pharmacy. Please sign the script for his [address] 2 Sensors.  Thank you

## 2022-05-10 NOTE — TELEPHONE ENCOUNTER
Called patient and left a message letting him know that a script for the Jerome 2 Sensors was sent to GUEVARA Solis CNP to be signed and sent to [address] [place] [address] in [place].

## 2022-05-16 ENCOUNTER — NURSE ONLY (OUTPATIENT)
Dept: INTERNAL MEDICINE CLINIC | Age: 67
End: 2022-05-16
Payer: MEDICARE

## 2022-05-16 DIAGNOSIS — E11.9 CONTROLLED TYPE 2 DIABETES MELLITUS WITHOUT COMPLICATION, WITHOUT LONG-TERM CURRENT USE OF INSULIN (HCC): ICD-10-CM

## 2022-05-16 PROCEDURE — 95249 CONT GLUC MNTR PT PROV EQP: CPT | Performed by: INTERNAL MEDICINE

## 2022-05-16 NOTE — PROGRESS NOTES
Rod 2 sensor inserted on back of Right Arm. Sensor initiated via [employer] . Reviewed interstitial glucose versus capillary glucose. Instructed on  setting and set up the appropriate alarms. Time and date set on   80 for Low Alarm   250 for High Alarm. Explained the arrows and the meaning of their blood sugar. Patient instructed the system is water resistant and can shower. To reinforce sensor with tape as needed if it begins to peel away from the skin. Encouraged Juliet Elmore to [facility] or [facility] with any questions. Juliet Elmore verbalizes, via teach back, the understanding of:  Difference between sensor glucose and blood glucose meter. Fingerstick confirmations required for calibrations. Blood sugar trend arrows. Site selection, rotation, preparation. Proper steps to sensor insertion. Starting the sensor. Navigation of status, set up, and alert screens. Scanning frequency.   Options for support

## 2022-09-14 DIAGNOSIS — E11.9 CONTROLLED TYPE 2 DIABETES MELLITUS WITHOUT COMPLICATION, WITHOUT LONG-TERM CURRENT USE OF INSULIN (HCC): ICD-10-CM

## 2022-09-14 RX ORDER — INSULIN ASPART 100 [IU]/ML
INJECTION, SOLUTION INTRAVENOUS; SUBCUTANEOUS
Qty: 15 ML | Refills: 3 | Status: SHIPPED | OUTPATIENT
Start: 2022-09-14

## 2022-09-14 RX ORDER — DULAGLUTIDE 3 MG/.5ML
INJECTION, SOLUTION SUBCUTANEOUS
Qty: 6 ML | Refills: 3 | Status: SHIPPED | OUTPATIENT
Start: 2022-09-14

## 2022-09-15 ENCOUNTER — OFFICE VISIT (OUTPATIENT)
Dept: FAMILY MEDICINE CLINIC | Age: 67
End: 2022-09-15
Payer: MEDICARE

## 2022-09-15 ENCOUNTER — TELEPHONE (OUTPATIENT)
Dept: FAMILY MEDICINE CLINIC | Age: 67
End: 2022-09-15

## 2022-09-15 VITALS
DIASTOLIC BLOOD PRESSURE: 76 MMHG | BODY MASS INDEX: 37.16 KG/M2 | WEIGHT: 280.4 LBS | HEIGHT: 73 IN | SYSTOLIC BLOOD PRESSURE: 128 MMHG | RESPIRATION RATE: 14 BRPM | HEART RATE: 110 BPM

## 2022-09-15 DIAGNOSIS — E11.65 UNCONTROLLED TYPE 2 DIABETES MELLITUS WITH HYPERGLYCEMIA (HCC): ICD-10-CM

## 2022-09-15 DIAGNOSIS — Z00.00 INITIAL MEDICARE ANNUAL WELLNESS VISIT: Primary | ICD-10-CM

## 2022-09-15 DIAGNOSIS — E11.65 TYPE 2 DIABETES MELLITUS WITH HYPERGLYCEMIA, WITH LONG-TERM CURRENT USE OF INSULIN (HCC): ICD-10-CM

## 2022-09-15 DIAGNOSIS — E78.2 MIXED HYPERLIPIDEMIA: ICD-10-CM

## 2022-09-15 DIAGNOSIS — I10 ESSENTIAL HYPERTENSION: ICD-10-CM

## 2022-09-15 DIAGNOSIS — N34.2 INFECTIVE URETHRITIS: ICD-10-CM

## 2022-09-15 DIAGNOSIS — Z79.4 TYPE 2 DIABETES MELLITUS WITH HYPERGLYCEMIA, WITH LONG-TERM CURRENT USE OF INSULIN (HCC): ICD-10-CM

## 2022-09-15 DIAGNOSIS — E11.9 CONTROLLED TYPE 2 DIABETES MELLITUS WITHOUT COMPLICATION, WITHOUT LONG-TERM CURRENT USE OF INSULIN (HCC): ICD-10-CM

## 2022-09-15 DIAGNOSIS — I49.9 IRREGULAR HEART RHYTHM: ICD-10-CM

## 2022-09-15 PROCEDURE — 3046F HEMOGLOBIN A1C LEVEL >9.0%: CPT | Performed by: NURSE PRACTITIONER

## 2022-09-15 PROCEDURE — 1123F ACP DISCUSS/DSCN MKR DOCD: CPT | Performed by: NURSE PRACTITIONER

## 2022-09-15 PROCEDURE — 99214 OFFICE O/P EST MOD 30 MIN: CPT | Performed by: NURSE PRACTITIONER

## 2022-09-15 PROCEDURE — G0438 PPPS, INITIAL VISIT: HCPCS | Performed by: NURSE PRACTITIONER

## 2022-09-15 RX ORDER — ATORVASTATIN CALCIUM 80 MG/1
80 TABLET, FILM COATED ORAL DAILY
Qty: 90 TABLET | Refills: 3 | Status: SHIPPED | OUTPATIENT
Start: 2022-09-15

## 2022-09-15 RX ORDER — INSULIN GLARGINE 100 [IU]/ML
INJECTION, SOLUTION SUBCUTANEOUS
Qty: 15 ML | Refills: 1 | Status: SHIPPED | OUTPATIENT
Start: 2022-09-15

## 2022-09-15 RX ORDER — LOSARTAN POTASSIUM 50 MG/1
TABLET ORAL
Qty: 90 TABLET | Refills: 3 | Status: SHIPPED | OUTPATIENT
Start: 2022-09-15

## 2022-09-15 RX ORDER — PIOGLITAZONEHYDROCHLORIDE 45 MG/1
TABLET ORAL
Qty: 90 TABLET | Refills: 3 | Status: SHIPPED | OUTPATIENT
Start: 2022-09-15

## 2022-09-15 RX ORDER — FLASH GLUCOSE SENSOR
KIT MISCELLANEOUS
Qty: 6 EACH | Refills: 3 | Status: SHIPPED | OUTPATIENT
Start: 2022-09-15

## 2022-09-15 SDOH — ECONOMIC STABILITY: FOOD INSECURITY: WITHIN THE PAST 12 MONTHS, YOU WORRIED THAT YOUR FOOD WOULD RUN OUT BEFORE YOU GOT MONEY TO BUY MORE.: NEVER TRUE

## 2022-09-15 SDOH — ECONOMIC STABILITY: FOOD INSECURITY: WITHIN THE PAST 12 MONTHS, THE FOOD YOU BOUGHT JUST DIDN'T LAST AND YOU DIDN'T HAVE MONEY TO GET MORE.: NEVER TRUE

## 2022-09-15 ASSESSMENT — ENCOUNTER SYMPTOMS
CONSTIPATION: 0
SHORTNESS OF BREATH: 0

## 2022-09-15 ASSESSMENT — PATIENT HEALTH QUESTIONNAIRE - PHQ9
SUM OF ALL RESPONSES TO PHQ9 QUESTIONS 1 & 2: 0
SUM OF ALL RESPONSES TO PHQ QUESTIONS 1-9: 0
SUM OF ALL RESPONSES TO PHQ QUESTIONS 1-9: 0
1. LITTLE INTEREST OR PLEASURE IN DOING THINGS: 0
SUM OF ALL RESPONSES TO PHQ QUESTIONS 1-9: 0
2. FEELING DOWN, DEPRESSED OR HOPELESS: 0
SUM OF ALL RESPONSES TO PHQ QUESTIONS 1-9: 0

## 2022-09-15 ASSESSMENT — SOCIAL DETERMINANTS OF HEALTH (SDOH): HOW HARD IS IT FOR YOU TO PAY FOR THE VERY BASICS LIKE FOOD, HOUSING, MEDICAL CARE, AND HEATING?: NOT HARD AT ALL

## 2022-09-15 ASSESSMENT — LIFESTYLE VARIABLES
HOW MANY STANDARD DRINKS CONTAINING ALCOHOL DO YOU HAVE ON A TYPICAL DAY: 1 OR 2
HOW OFTEN DO YOU HAVE A DRINK CONTAINING ALCOHOL: MONTHLY OR LESS

## 2022-09-15 NOTE — PATIENT INSTRUCTIONS
Personalized Preventive Plan for Blake Shepherd - 9/15/2022  Medicare offers a range of preventive health benefits. Some of the tests and screenings are paid in full while other may be subject to a deductible, co-insurance, and/or copay. Some of these benefits include a comprehensive review of your medical history including lifestyle, illnesses that may run in your family, and various assessments and screenings as appropriate. After reviewing your medical record and screening and assessments performed today your provider may have ordered immunizations, labs, imaging, and/or referrals for you. A list of these orders (if applicable) as well as your Preventive Care list are included within your After Visit Summary for your review. Other Preventive Recommendations:    A preventive eye exam performed by an eye specialist is recommended every 1-2 years to screen for glaucoma; cataracts, macular degeneration, and other eye disorders. A preventive dental visit is recommended every 6 months. Try to get at least 150 minutes of exercise per week or 10,000 steps per day on a pedometer . Order or download the FREE \"Exercise & Physical Activity: Your Everyday Guide\" from [employer]. Call 0-516.456.7180 or search [employer] online. You need 7554-8280 mg of calcium and 8708-4728 IU of vitamin D per day. It is possible to meet your calcium requirement with diet alone, but a vitamin D supplement is usually necessary to meet this goal.  When exposed to the sun, use a sunscreen that protects against both UVA and UVB radiation with an SPF of 30 or greater. Reapply every 2 to 3 hours or after sweating, drying off with a towel, or swimming. Always wear a seat belt when traveling in a car. Always wear a helmet when riding a bicycle or motorcycle.

## 2022-09-15 NOTE — PROGRESS NOTES
Patient informed and scheduled at [facility] on 9/23/22 arrive at 2:00 pm for a 2:30 pm 48 hour holter monitor. Patient aware once the office gets results we will notify him.

## 2022-09-15 NOTE — PROGRESS NOTES
Negative for shortness of breath. Gastrointestinal:  Negative for constipation. Genitourinary:  Negative for dysuria and frequency. Skin:  Negative for rash. Neurological:  Negative for light-headedness. Psychiatric/Behavioral: Negative. Objective:   Physical Exam  Constitutional:       General: He is not in acute distress. Eyes:      Pupils: Pupils are equal, round, and reactive to light. Cardiovascular:      Rate and Rhythm: Normal rate and regular rhythm. Heart sounds: No murmur heard. Pulmonary:      Effort: Pulmonary effort is normal.      Breath sounds: Normal breath sounds. No wheezing. Abdominal:      General: Bowel sounds are normal. There is no distension. Palpations: Abdomen is soft. Tenderness: There is no abdominal tenderness. Musculoskeletal:         General: No tenderness. Normal range of motion. Cervical back: Normal range of motion and neck supple. Skin:     General: Skin is warm and dry. Findings: No rash. Assessment:       Diagnosis Orders   1. Initial Medicare annual wellness visit        2. Controlled type 2 diabetes mellitus without complication, without long-term current use of insulin (HCC)  Hemoglobin A1c    insulin glargine (LANTUS SOLOSTAR) 100 UNIT/ML injection pen    metFORMIN (GLUCOPHAGE) 1000 MG tablet      3. Type 2 diabetes mellitus with hyperglycemia, with long-term current use of insulin ([address]        4. Essential hypertension  losartan (COZAAR) 50 MG tablet      5. Uncontrolled type 2 diabetes mellitus with hyperglycemia (HCC)  empagliflozin (JARDIANCE) 25 MG tablet    pioglitazone (ACTOS) 45 MG tablet    Continuous Blood Gluc Sensor (FREESTYLE RUMA 2 SENSOR) MISC      6. Mixed hyperlipidemia  atorvastatin (LIPITOR) 80 MG tablet      7. Irregular heart rhythm  Holter Monitor 48 Hour      8.  Infective urethritis                  Plan:       ED Report and Labs reviewed  Urinary symptoms resolved  No mention of abnormal EKG in ED correspondence  NSR in office - 48 hour Holter  Chronic conditions   Recheck A1C  DM Clinic managing dosing of insulin  Refills as above  Diet and exercise stressed  Immunizations discussed   Labs in 4 months  RTO in 4 months      Luci Almonte received counseling on the following healthy behaviors: nutrition and exercise  Reviewed prior labs and health maintenance  Continue current medications, diet and exercise. Discussed use, benefit, and side effects of prescribed medications. Barriers to medication compliance addressed. Patient given educational materials - see patient instructions  Was a self-tracking handout given in paper form or via [employer]? No:     Requested Prescriptions     Signed Prescriptions Disp Refills    insulin glargine (LANTUS SOLOSTAR) 100 UNIT/ML injection pen 15 mL 1     Sig: INJECT 30 UNITS SUBCUTANEOUSLY TWICE A DAY    metFORMIN (GLUCOPHAGE) 1000 MG tablet 180 tablet 3     Sig: take 1 tablet by mouth twice a day with meals    losartan (COZAAR) 50 MG tablet 90 tablet 3     Sig: take 1 tablet by mouth once daily    empagliflozin (JARDIANCE) 25 MG tablet 90 tablet 3     Sig: Take 1 tablet by mouth daily    pioglitazone (ACTOS) 45 MG tablet 90 tablet 3     Sig: take 1 tablet by mouth once daily    atorvastatin (LIPITOR) 80 MG tablet 90 tablet 3     Sig: Take 1 tablet by mouth daily    Continuous Blood Gluc Sensor (FREESTYLE RUMA 2 SENSOR) MISC 6 each 3     Sig: Replace Sensor every 14 Days. All patient questions answered. Patient voiced understanding. Quality Measures    Body mass index is 36.99 kg/m². Elevated. Weight control planned discussed Healthy diet and regular exercise. BP: 128/76 Blood pressure is normal. Treatment plan consists of No treatment change needed.     Lab Results   Component Value Date    LDLCALC 87 04/12/2022    LDLDIRECT 178 (H) 07/02/2015    (goal LDL reduction with dx if diabetes is 50% LDL reduction)      PHQ Scores 9/15/2022 8/26/2021 1/26/2021 4/21/2020 4/24/2019 1/19/2018 1/11/2017   PHQ2 Score 0 0 0 0 0 0 0   PHQ9 Score 0 0 0 0 0 0 0     Interpretation of Total Score Depression Severity: 1-4 = Minimal depression, 5-9 = Mild depression, 10-14 = Moderate depression, 15-19 = Moderately severe depression, 20-27 = Severe depression

## 2022-09-15 NOTE — PROGRESS NOTES
Medicare Annual Wellness Visit    Aundrea Riggins is here for Medicare AWV    Assessment & Plan   Initial Medicare annual wellness visit    Recommendations for Preventive Services Due: see orders and patient instructions/AVS.  Recommended screening schedule for the next 5-10 years is provided to the patient in written form: see Patient Instructions/AVS.     Return for Medicare Annual Wellness Visit in 1 year. Subjective     Patient's complete Health Risk Assessment and screening values have been reviewed and are found in Flowsheets. The following problems were reviewed today and where indicated follow up appointments were made and/or referrals ordered.     Positive Risk Factor Screenings with Interventions:             General Health and ACP:  General  In general, how would you say your health is?: Good  In the past 7 days, have you experienced any of the following: New or Increased Pain, New or Increased Fatigue, Loneliness, Social Isolation, Stress or Anger?: No  Do you get the social and emotional support that you need?: Yes  Do you have a Living Will?: Yes    Advance Directives       Power of  Living Will ACP-Advance Directive ACP-Power of     Not on File Not on File Not on File Not on File        General Health Risk Interventions:  NA    Health Habits/Nutrition:  Physical Activity: Insufficiently Active    Days of Exercise per Week: 3 days    Minutes of Exercise per Session: 20 min     Have you lost any weight without trying in the past 3 months?: No  Body mass index: (!) 36.99  Have you seen the dentist within the past year?: (!) No  Health Habits/Nutrition Interventions:  Inadequate physical activity:  educational materials provided to promote increased physical activity  Nutritional issues:  educational materials for healthy, well-balanced diet provided    Hearing/Vision:  Do you or your family notice any trouble with your hearing that hasn't been managed with hearing aids?: No  Do you have difficulty driving, watching TV, or doing any of your daily activities because of your eyesight?: No  Have you had an eye exam within the past year?: (!) No  No results found. Hearing/Vision Interventions:  Vision concerns:  patient encouraged to make appointment with his/her eye specialist            Objective   Vitals:    09/15/22 1316   BP: 128/76   Pulse: (!) 110   Resp: 14   Weight: 280 lb 6.4 oz (127.2 kg)   Height: 6' 1\" (1.854 m)      Body mass index is 36.99 kg/m². Allergies   Allergen Reactions    Terramycin [Oxytetracycline-Polymyxin B] Swelling     Prior to Visit Medications    Medication Sig Taking? Authorizing Provider   Misc Natural Products (URINOZINC PO) Take by mouth daily Yes Historical Provider, MD   NONFORMULARY Sanguenol qd Yes Historical Provider, MD   TRULICITY 3 LR/2.8XN SOPN INJECT 3MG INTO THE SKIN ONCE PER WEEK Yes NILE Champion CNP   NOVOLOG FLEXPEN 100 UNIT/ML injection pen inject 10 units subcutaneously three times a day before meals Yes NILE Champion CNP   Continuous Blood Gluc Sensor (FREESTYLE RUMA 2 SENSOR) MISC Replace Sensor every 14 Days.  Yes NILE Champion CNP   LANTUS SOLOSTAR 100 UNIT/ML injection pen INJECT 30 UNITS SUBCUTANEOUSLY TWICE A DAY Yes NILE Champion CNP   psyllium (METAMUCIL) 28.3 % POWD powder Take 3.4 g by mouth daily Yes Historical Provider, MD   Insulin Pen Needle 31G X 8 MM MISC 1 each by Does not apply route daily Yes NILE Champion CNP   b complex vitamins capsule Take 1 capsule by mouth daily Super complex Yes Historical Provider, MD Johnson Cuney 450 MG CAPS Take by mouth daily Yes Historical Provider, MD   Cholecalciferol (VITAMIN D3 PO) Take by mouth daily Yes Historical Provider, MD   pioglitazone (ACTOS) 45 MG tablet take 1 tablet by mouth once daily Yes NILE Champion CNP   empagliflozin (JARDIANCE) 25 MG tablet Take 25 mg by mouth daily Yes NILE Champion CNP

## 2022-09-15 NOTE — TELEPHONE ENCOUNTER
PA request received from pharmacy for Novolog Flex Pen 100units/ml #15 ml for 30 days. PA submitted online at [employer] and approved.     PA Case: [id number], Status: Approved, Coverage Starts on: 6/16/2022 12:00:00 AM, Coverage Ends on: 9/15/2023 12:00:00 AM.    Pharmacy notified via VM

## 2022-10-10 ENCOUNTER — HOSPITAL ENCOUNTER (OUTPATIENT)
Dept: NON INVASIVE DIAGNOSTICS | Age: 67
Discharge: HOME | End: 2022-10-10
Payer: MEDICARE

## 2022-10-10 DIAGNOSIS — I49.9 IRREGULAR HEART RHYTHM: ICD-10-CM

## 2022-10-10 PROCEDURE — 93225 XTRNL ECG REC<48 HRS REC: CPT

## 2022-10-10 PROCEDURE — 93226 XTRNL ECG REC<48 HR SCAN A/R: CPT

## 2022-10-10 NOTE — PROCEDURES
48 hour Holter Monitor was applied to patient.  Patient was instructed to remove monitor on 10/12 at 1414 and return to  of hospital. The serial number on the Holter Monitor is [id number]

## 2022-10-14 LAB
ACQUISITION DURATION: NORMAL S
AVERAGE HEART RATE: 89 BPM
HOOKUP DATE: NORMAL
HOOKUP TIME: NORMAL
MAX HEART RATE TIME/DATE: NORMAL
MAX HEART RATE: 129 BPM
MIN HEART RATE TIME/DATE: NORMAL
MIN HEART RATE: 60 BPM
NUMBER OF QRS COMPLEXES: NORMAL
NUMBER OF SUPRAVENTRICULAR COUPLETS: 0
NUMBER OF SUPRAVENTRICULAR ECTOPICS: 21
NUMBER OF SUPRAVENTRICULAR ISOLATED BEATS: 10
NUMBER OF VENTRICULAR BIGEMINAL CYCLES: 2
NUMBER OF VENTRICULAR COUPLETS: 2
NUMBER OF VENTRICULAR ECTOPICS: 70

## 2022-11-16 RX ORDER — INSULIN ASPART 100 [IU]/ML
INJECTION, SOLUTION INTRAVENOUS; SUBCUTANEOUS
Qty: 15 ML | Refills: 3 | Status: SHIPPED | OUTPATIENT
Start: 2022-11-16

## 2022-11-16 NOTE — TELEPHONE ENCOUNTER
Pt called office requesting a refill of his Novolog and pen needles to [facility]. If no call back he will check with the pharmacy after 4pm. Refill if appropriate.

## 2022-12-01 DIAGNOSIS — E11.65 UNCONTROLLED TYPE 2 DIABETES MELLITUS WITH HYPERGLYCEMIA (HCC): ICD-10-CM

## 2022-12-01 RX ORDER — FLASH GLUCOSE SENSOR
KIT MISCELLANEOUS
Qty: 6 EACH | Refills: 3 | Status: SHIPPED | OUTPATIENT
Start: 2022-12-01

## 2022-12-01 NOTE — TELEPHONE ENCOUNTER
Pt called office requesting a refill of the Freestyle Jerome 2 sensor to [facility]. If no call back he will check with the pharmacy after noon today. Refill if appropriate.

## 2022-12-27 NOTE — TELEPHONE ENCOUNTER
Pt called office for a refill of the [employer]. Advised there were 3 refills sent on 12/1/22. Pt will contact the pharmacy regarding.

## 2023-01-16 PROBLEM — R10.9 UNSPECIFIED ABDOMINAL PAIN: Status: ACTIVE | Noted: 2022-09-04

## 2023-01-16 PROBLEM — N39.0 ACUTE UTI: Status: ACTIVE | Noted: 2023-01-16

## 2023-01-16 PROBLEM — E16.2 HYPOGLYCEMIA: Status: ACTIVE | Noted: 2023-01-16

## 2023-02-20 ENCOUNTER — OFFICE VISIT (OUTPATIENT)
Dept: FAMILY MEDICINE CLINIC | Age: 68
End: 2023-02-20
Payer: MEDICARE

## 2023-02-20 VITALS
WEIGHT: 289.1 LBS | SYSTOLIC BLOOD PRESSURE: 134 MMHG | BODY MASS INDEX: 38.14 KG/M2 | HEART RATE: 96 BPM | DIASTOLIC BLOOD PRESSURE: 86 MMHG | RESPIRATION RATE: 18 BRPM

## 2023-02-20 DIAGNOSIS — E11.9 CONTROLLED TYPE 2 DIABETES MELLITUS WITHOUT COMPLICATION, WITHOUT LONG-TERM CURRENT USE OF INSULIN (HCC): Primary | ICD-10-CM

## 2023-02-20 DIAGNOSIS — E78.2 MIXED HYPERLIPIDEMIA: ICD-10-CM

## 2023-02-20 DIAGNOSIS — E66.01 SEVERE OBESITY (BMI 35.0-39.9) WITH COMORBIDITY (HCC): ICD-10-CM

## 2023-02-20 DIAGNOSIS — Z86.73 HISTORY OF CVA (CEREBROVASCULAR ACCIDENT): ICD-10-CM

## 2023-02-20 DIAGNOSIS — Z12.5 SCREENING PSA (PROSTATE SPECIFIC ANTIGEN): ICD-10-CM

## 2023-02-20 DIAGNOSIS — I10 ESSENTIAL HYPERTENSION: ICD-10-CM

## 2023-02-20 PROBLEM — E11.65 TYPE 2 DIABETES MELLITUS WITH HYPERGLYCEMIA (HCC): Status: RESOLVED | Noted: 2022-09-15 | Resolved: 2023-02-20

## 2023-02-20 PROCEDURE — 1123F ACP DISCUSS/DSCN MKR DOCD: CPT | Performed by: NURSE PRACTITIONER

## 2023-02-20 PROCEDURE — 99214 OFFICE O/P EST MOD 30 MIN: CPT | Performed by: NURSE PRACTITIONER

## 2023-02-20 PROCEDURE — 3075F SYST BP GE 130 - 139MM HG: CPT | Performed by: NURSE PRACTITIONER

## 2023-02-20 PROCEDURE — 3079F DIAST BP 80-89 MM HG: CPT | Performed by: NURSE PRACTITIONER

## 2023-02-20 RX ORDER — DULAGLUTIDE 3 MG/.5ML
INJECTION, SOLUTION SUBCUTANEOUS
Qty: 6 ML | Refills: 3 | Status: SHIPPED | OUTPATIENT
Start: 2023-02-20

## 2023-02-20 SDOH — ECONOMIC STABILITY: HOUSING INSECURITY
IN THE LAST 12 MONTHS, WAS THERE A TIME WHEN YOU DID NOT HAVE A STEADY PLACE TO SLEEP OR SLEPT IN A SHELTER (INCLUDING NOW)?: NO

## 2023-02-20 SDOH — ECONOMIC STABILITY: FOOD INSECURITY: WITHIN THE PAST 12 MONTHS, YOU WORRIED THAT YOUR FOOD WOULD RUN OUT BEFORE YOU GOT MONEY TO BUY MORE.: NEVER TRUE

## 2023-02-20 SDOH — ECONOMIC STABILITY: INCOME INSECURITY: HOW HARD IS IT FOR YOU TO PAY FOR THE VERY BASICS LIKE FOOD, HOUSING, MEDICAL CARE, AND HEATING?: NOT HARD AT ALL

## 2023-02-20 SDOH — ECONOMIC STABILITY: FOOD INSECURITY: WITHIN THE PAST 12 MONTHS, THE FOOD YOU BOUGHT JUST DIDN'T LAST AND YOU DIDN'T HAVE MONEY TO GET MORE.: NEVER TRUE

## 2023-02-20 ASSESSMENT — PATIENT HEALTH QUESTIONNAIRE - PHQ9
SUM OF ALL RESPONSES TO PHQ9 QUESTIONS 1 & 2: 0
1. LITTLE INTEREST OR PLEASURE IN DOING THINGS: 0
SUM OF ALL RESPONSES TO PHQ QUESTIONS 1-9: 0
2. FEELING DOWN, DEPRESSED OR HOPELESS: 0
SUM OF ALL RESPONSES TO PHQ QUESTIONS 1-9: 0

## 2023-02-20 ASSESSMENT — ENCOUNTER SYMPTOMS: CONSTIPATION: 0

## 2023-02-20 NOTE — PROGRESS NOTES
Subjective:      Patient ID: Monica Reyes is a 78 y.o. male. HPI: 4 month Follow Up    Chief Complaint   Patient presents with    Follow-up     4 months    Hypertension    Diabetes    Medication Refill       He is to be on Metformin 1000 mg BID, Jardiance 25 mg,  Actos 45 mg and Trulcity 3mg. On Lantus 30 units BID and Novolog 10 u TID    Due for A1C. Following with [facility] - last seen Spring 2022. Has CGM and is taking his insulin now since he is able to check his BS    States compliance. Lab Results   Component Value Date    LABA1C 9.9 (H) 04/12/2022     Lab Results   Component Value Date     07/02/2015       Patient Active Problem List   Diagnosis    Controlled type 2 diabetes mellitus without complication, without long-term current use of insulin ([address]    Essential hypertension    History of CVA (cerebrovascular accident)    Mixed hyperlipidemia    Obesity (BMI 30-39. 9)    Acute UTI    Hypoglycemia    Unspecified abdominal pain         COLONOSCOPY - 2016    POD - Dr. Sheril Harada    Overall doing well. Denies CP, SOB or chest tightness. No smoking hx. BP Readings from Last 3 Encounters:   02/20/23 134/86   09/15/22 128/76   04/13/22 134/62       BP wnl. Losartan 50 mg. Wt Readings from Last 3 Encounters:   02/20/23 289 lb 1.6 oz (131.1 kg)   09/15/22 280 lb 6.4 oz (127.2 kg)   04/13/22 277 lb 4.8 oz (125.8 kg)       Labs reviewed. Lipitor 80 mg. Tolerating well.      No components found for: CHLPL  Lab Results   Component Value Date    TRIG 76 04/12/2022    TRIG 142 05/27/2021    TRIG 139 04/17/2020     Lab Results   Component Value Date    HDL 43 04/12/2022    HDL 42 05/27/2021    HDL 46 04/17/2020     Lab Results   Component Value Date    LDLCALC 87 04/12/2022    LDLCALC 166 05/27/2021    LDLCALC 185 04/17/2020     No results found for: LABVLDL      Chemistry        Component Value Date/Time     04/12/2022 1416    K 4.4 04/12/2022 1416     04/12/2022 1416    CO2 25 04/12/2022 1416    BUN 11 04/12/2022 1416    CREATININE 1.0 04/12/2022 1416        Component Value Date/Time    CALCIUM 9.0 04/12/2022 1416    ALKPHOS 96 04/12/2022 1416    AST 29 04/12/2022 1416    ALT 28 04/12/2022 1416    BILITOT 0.3 04/12/2022 1416          Lab Results   Component Value Date    TSH 3.080 04/12/2022       Lab Results   Component Value Date    WBC 7.1 04/12/2022    HGB 15.8 04/12/2022    HCT 48.4 04/12/2022    MCV 91.8 04/12/2022     04/12/2022       Health Maintenance   Topic Date Due    COVID-19 Vaccine (3 - Booster for Warren series) 03/09/2022    Shingles vaccine (2 of 2) 07/06/2022    A1C test (Diabetic or Prediabetic)  07/12/2022    Flu vaccine (1) 08/01/2022    Diabetic retinal exam  12/02/2022    Diabetic Alb to Cr ratio (uACR) test  04/12/2023    Lipids  04/12/2023    GFR test (Diabetes, CKD 3-4, OR last GFR 15-59)  04/12/2023    Diabetic foot exam  04/13/2023    Pneumococcal 65+ years Vaccine (2 - PCV) 04/13/2023    Depression Screen  09/15/2023    Annual Wellness Visit (AWV)  09/16/2023    Colorectal Cancer Screen  01/09/2027    DTaP/Tdap/Td vaccine (2 - Td or Tdap) 01/11/2027    Hepatitis C screen  Completed    Hepatitis A vaccine  Aged Out    Hib vaccine  Aged Out    Meningococcal (ACWY) vaccine  Aged [employer] History   Administered Date(s) Administered    COVID-19, J&J, (age 18y+), IM, 0.5 mL 09/01/2021    COVID-19, PFIZER GRAY top, DO NOT Dilute, (age 15 y+), IM, 30 mcg/0.3 mL 01/12/2022    Influenza, AFLURIA (age 1 yrs+), FLUZONE, (age 10 mo+), MDV, 0.5mL 01/11/2017, 10/18/2018    Pneumococcal Polysaccharide ([id number]) 01/11/2017, 04/13/2022    Tdap (Boostrix, Adacel) 01/11/2017    Zoster Recombinant (Shingrix) 05/11/2022       Review of Systems   HENT: Negative. Gastrointestinal:  Negative for constipation. Genitourinary:  Negative for dysuria and frequency. Neurological:  Negative for light-headedness. Psychiatric/Behavioral: Negative. Objective:   Physical Exam  Constitutional:       General: He is not in acute distress. Eyes:      Pupils: Pupils are equal, round, and reactive to light. Cardiovascular:      Rate and Rhythm: Normal rate and regular rhythm. Heart sounds: No murmur heard. Pulmonary:      Effort: Pulmonary effort is normal.      Breath sounds: Normal breath sounds. No wheezing. Abdominal:      General: Bowel sounds are normal. There is no distension. Palpations: Abdomen is soft. Tenderness: There is no abdominal tenderness. Musculoskeletal:         General: No tenderness. Normal range of motion. Cervical back: Normal range of motion and neck supple. Skin:     General: Skin is warm and dry. Findings: No rash. Assessment:       Diagnosis Orders   1. Controlled type 2 diabetes mellitus without complication, without long-term current use of insulin ([facility])  Insulin Pen Needle 31G X 8 MM MISC    Hemoglobin A1c    Comprehensive Metabolic Panel    MICROALBUMIN, RANDOM URINE (W/O CREATININE)    Dulaglutide (TRULICITY) 3 SD/6.5KD SOPN      2. Essential hypertension        3. Mixed hyperlipidemia  CBC    Lipid Panel    TSH with Reflex      4. History of CVA (cerebrovascular accident)        5. Severe obesity (BMI 35.0-39. 9) with comorbidity ([address]        6. Screening PSA (prostate specific antigen)  PSA Screening                Plan:       Chronic conditions   Labs as above to monitor  Refills as above  Diet and exercise stressed  Immunizations discussed   RTO in 6 months      Jerry Patrick received counseling on the following healthy behaviors: nutrition and exercise  Reviewed prior labs and health maintenance  Continue current medications, diet and exercise. Discussed use, benefit, and side effects of prescribed medications. Barriers to medication compliance addressed. Patient given educational materials - see patient instructions  Was a self-tracking handout given in paper form or via [employer]?  No: Requested Prescriptions     Signed Prescriptions Disp Refills    Insulin Pen Needle 31G X 8 MM MISC 300 each 3     Si each by Does not apply route 3 times daily    Dulaglutide (TRULICITY) 3 RZ/8.5CA SOPN 6 mL 3     Sig: INJECT 3MG INTO THE SKIN ONCE PER WEEK       All patient questions answered. Patient voiced understanding. Quality Measures    Body mass index is 38.14 kg/m². Elevated. Weight control planned discussed Healthy diet and regular exercise. BP: 134/86 Blood pressure is normal. Treatment plan consists of No treatment change needed.     Lab Results   Component Value Date    LDLCALC 87 2022    LDLDIRECT 178 (H) 2015    (goal LDL reduction with dx if diabetes is 50% LDL reduction)      PHQ Scores 2023 9/15/2022 2021 2021 2020 2019 2018   PHQ2 Score 0 0 0 0 0 0 0   PHQ9 Score 0 0 0 0 0 0 0     Interpretation of Total Score Depression Severity: 1-4 = Minimal depression, 5-9 = Mild depression, 10-14 = Moderate depression, 15-19 = Moderately severe depression, 20-27 = Severe depression

## 2023-04-27 DIAGNOSIS — E11.9 CONTROLLED TYPE 2 DIABETES MELLITUS WITHOUT COMPLICATION, WITHOUT LONG-TERM CURRENT USE OF INSULIN (HCC): ICD-10-CM

## 2023-04-27 RX ORDER — INSULIN GLARGINE 100 [IU]/ML
INJECTION, SOLUTION SUBCUTANEOUS
Qty: 15 ML | Refills: 1 | Status: SHIPPED | OUTPATIENT
Start: 2023-04-27

## 2023-04-27 RX ORDER — INSULIN GLARGINE 100 [IU]/ML
INJECTION, SOLUTION SUBCUTANEOUS
Qty: 15 ML | Refills: 1 | OUTPATIENT
Start: 2023-04-27

## 2023-04-27 NOTE — TELEPHONE ENCOUNTER
Patient called and stated that he is out of his Lantus and needs a new prescription. He is on Lantus 30 units BID. He would like this sent to [facility] aid. Patient will check his pharmacy later today.

## 2023-08-22 ENCOUNTER — TELEPHONE (OUTPATIENT)
Dept: PHARMACY | Facility: CLINIC | Age: 68
End: 2023-08-22

## 2023-08-22 PROBLEM — R42 DIZZINESS AND GIDDINESS: Status: ACTIVE | Noted: 2022-10-23

## 2023-08-23 NOTE — TELEPHONE ENCOUNTER
Address today I meant
Appears that patient did not show for OV on 8/22/23 - will continue to follow for new OV date.      Justen Corral, PharmD, [facility]  Department, toll free: [phone], option 1      For Pharmacy Admin Tracking Only  Program: Shaila in place:  No  Gap Closed?: No   Time Spent (min): 30'
Thank you for update. Will address with patient tomorrow.
[facility]HERENCE  Insurance Records claims through 23  YTD [address] = no data for     Per Reconciled Dispense Report:  TRULICITY 3 HR/3.2 ML PEN 2022 84 6 mL Rip Ap [address] #[id number] - [place]. .. JARDIANCE 25 MG TABLET 2022 90 90 each [address] [facility] #[id number] - [place]. .. METFORMIN HCL 1,000 MG TABLET 2022 90 180 each Rip Ap, [address] #[id number] - [place]. .. PIOGLITAZONE HCL 45 MG TABLET 2022 90 90 each Rip Ap[address] #[id number] - [place]. .. Per [employer] Portal:  Same as above; no refills in     Lab Results   Component Value Date    LABA1C 9.9 (H) 2022    LABA1C 10.9 2021    LABA1C 11.0 2021     NOTE A1c not yet completed this calendar year        C/Casia 10 Records claims through 23  YTD [address] = no data for     Per Reconciled Dispense Report:  LOSARTAN POTASSIUM 50 MG TAB 06/15/2022 90 90 each [address] [place] [address] #[id number] - [place]. .. Per [employer] [place] Portal:  Same as above; no refills in     BP Readings from Last 3 Encounters:   23 134/86   09/15/22 128/76   22 134/62     CrCl cannot be calculated (Patient's most recent lab result is older than the maximum 180 days allowed. ). STATIN GAP IDENTIFIED    Per chart review, a statin is prescribed for patient; however, the patient has not filled and picked up the medication. Patient is prescribed atorvastatin 80 mg daily; patient last refilled atorvastatin on 6/15/22 for 90 days; current prescription has , as patient has not filled prescription within 6 months of issue date. Per Reconciled Dispense Report:  ATORVASTATIN 80 MG TABLET 06/15/2022 90 90 each [address] [place] [address] #[id number] - [place]. .. ATORVASTATIN 80 MG TABLET 2022 90 90 each [address] [place] [address] #[id number] - [place]. ..      Per [place] Portal:  Same as above; no refills in     Lab Results   Component Value Date    CHOL 145 2022    TRIG 76

## 2023-08-28 NOTE — PROGRESS NOTES
0.5mL 01/11/2017, 04/13/2022    TDaP, ADACEL (age 6y-58y), BOOSTRIX (age 10y+), IM, 0.5mL 01/11/2017    Zoster Recombinant (Shingrix) 05/11/2022, 02/22/2023       Review of Systems   HENT: Negative. Gastrointestinal:  Negative for constipation. Genitourinary:  Negative for dysuria and frequency. Neurological:  Negative for light-headedness. Psychiatric/Behavioral: Negative. Objective:   Physical Exam  Constitutional:       General: He is not in acute distress. Eyes:      Pupils: Pupils are equal, round, and reactive to light. Cardiovascular:      Rate and Rhythm: Normal rate and regular rhythm. Heart sounds: No murmur heard. Pulmonary:      Effort: Pulmonary effort is normal.      Breath sounds: Normal breath sounds. No wheezing. Abdominal:      General: Bowel sounds are normal. There is no distension. Palpations: Abdomen is soft. Tenderness: There is no abdominal tenderness. Musculoskeletal:         General: No tenderness. Normal range of motion. Cervical back: Normal range of motion and neck supple. Skin:     General: Skin is warm and dry. Findings: No rash. Assessment:       Diagnosis Orders   1. Uncontrolled type 2 diabetes mellitus with hyperglycemia (HCC)  Continuous Blood Gluc Sensor (FREESTYLE RUMA 2 SENSOR) MISC    pioglitazone (ACTOS) 45 MG tablet    empagliflozin (JARDIANCE) 25 MG tablet    metFORMIN (GLUCOPHAGE) 1000 MG tablet      2. Mixed hyperlipidemia  atorvastatin (LIPITOR) 80 MG tablet      3. Essential hypertension  losartan (COZAAR) 50 MG tablet      4. Severe obesity (BMI 35.0-39. 9) with comorbidity ([address])        5.  History of CVA (cerebrovascular accident)                  Plan:       States compliance with filling prescription  Chronic conditions   Labs reminded to complete  Refills as above  Diet and exercise stressed for weight loss and DM control  Immunizations discussed - complete at Pharmacy  RTO in 6 months      Cary Kilgore received

## 2023-08-29 ENCOUNTER — OFFICE VISIT (OUTPATIENT)
Dept: FAMILY MEDICINE CLINIC | Age: 68
End: 2023-08-29
Payer: MEDICARE

## 2023-08-29 ENCOUNTER — HOSPITAL ENCOUNTER (OUTPATIENT)
Age: 68
Discharge: HOME | End: 2023-08-29
Payer: MEDICARE

## 2023-08-29 VITALS
BODY MASS INDEX: 37.55 KG/M2 | RESPIRATION RATE: 16 BRPM | WEIGHT: 284.6 LBS | SYSTOLIC BLOOD PRESSURE: 122 MMHG | HEART RATE: 88 BPM | DIASTOLIC BLOOD PRESSURE: 60 MMHG

## 2023-08-29 DIAGNOSIS — E11.65 UNCONTROLLED TYPE 2 DIABETES MELLITUS WITH HYPERGLYCEMIA (HCC): Primary | ICD-10-CM

## 2023-08-29 DIAGNOSIS — I10 ESSENTIAL HYPERTENSION: ICD-10-CM

## 2023-08-29 DIAGNOSIS — E11.9 CONTROLLED TYPE 2 DIABETES MELLITUS WITHOUT COMPLICATION, WITHOUT LONG-TERM CURRENT USE OF INSULIN (HCC): ICD-10-CM

## 2023-08-29 DIAGNOSIS — Z86.73 HISTORY OF CVA (CEREBROVASCULAR ACCIDENT): ICD-10-CM

## 2023-08-29 DIAGNOSIS — E66.01 SEVERE OBESITY (BMI 35.0-39.9) WITH COMORBIDITY (HCC): ICD-10-CM

## 2023-08-29 DIAGNOSIS — E78.2 MIXED HYPERLIPIDEMIA: ICD-10-CM

## 2023-08-29 DIAGNOSIS — Z12.5 SCREENING PSA (PROSTATE SPECIFIC ANTIGEN): ICD-10-CM

## 2023-08-29 LAB
ALBUMIN SERPL BCG-MCNC: 4 G/DL (ref 3.5–5.1)
ALP SERPL-CCNC: 132 U/L (ref 38–126)
ALT SERPL W/O P-5'-P-CCNC: 15 U/L (ref 11–66)
ANION GAP SERPL CALC-SCNC: 16 MEQ/L (ref 8–16)
AST SERPL-CCNC: 16 U/L (ref 5–40)
BILIRUB SERPL-MCNC: 0.4 MG/DL (ref 0.3–1.2)
BUN SERPL-MCNC: 15 MG/DL (ref 7–22)
CALCIUM SERPL-MCNC: 9.4 MG/DL (ref 8.5–10.5)
CHLORIDE SERPL-SCNC: 98 MEQ/L (ref 98–111)
CO2 SERPL-SCNC: 22 MEQ/L (ref 23–33)
CREAT SERPL-MCNC: 1.3 MG/DL (ref 0.4–1.2)
DEPRECATED MEAN GLUCOSE BLD GHB EST-ACNC: 210 MG/DL (ref 70–126)
DEPRECATED RDW RBC AUTO: 43.6 FL (ref 35–45)
ERYTHROCYTE [DISTWIDTH] IN BLOOD BY AUTOMATED COUNT: 14.1 % (ref 11.5–14.5)
GFR SERPL CREATININE-BSD FRML MDRD: 60 ML/MIN/1.73M2
GLUCOSE SERPL-MCNC: 293 MG/DL (ref 70–108)
HBA1C MFR BLD HPLC: 9 % (ref 4.4–6.4)
HCT VFR BLD AUTO: 42.1 % (ref 42–52)
HGB BLD-MCNC: 13.2 GM/DL (ref 14–18)
MCH RBC QN AUTO: 26.8 PG (ref 26–33)
MCHC RBC AUTO-ENTMCNC: 31.4 GM/DL (ref 32.2–35.5)
MCV RBC AUTO: 85.4 FL (ref 80–94)
PLATELET # BLD AUTO: 456 THOU/MM3 (ref 130–400)
PMV BLD AUTO: 10.5 FL (ref 9.4–12.4)
POTASSIUM SERPL-SCNC: 4.3 MEQ/L (ref 3.5–5.2)
PROT SERPL-MCNC: 7.7 G/DL (ref 6.1–8)
PSA SERPL-MCNC: 0.74 NG/ML (ref 0–1)
RBC # BLD AUTO: 4.93 MILL/MM3 (ref 4.7–6.1)
SODIUM SERPL-SCNC: 136 MEQ/L (ref 135–145)
TSH SERPL DL<=0.005 MIU/L-ACNC: 2.77 UIU/ML (ref 0.4–4.2)
WBC # BLD AUTO: 6.5 THOU/MM3 (ref 4.8–10.8)

## 2023-08-29 PROCEDURE — 84443 ASSAY THYROID STIM HORMONE: CPT

## 2023-08-29 PROCEDURE — 36415 COLL VENOUS BLD VENIPUNCTURE: CPT

## 2023-08-29 PROCEDURE — 99214 OFFICE O/P EST MOD 30 MIN: CPT | Performed by: NURSE PRACTITIONER

## 2023-08-29 PROCEDURE — G0103 PSA SCREENING: HCPCS

## 2023-08-29 PROCEDURE — 3074F SYST BP LT 130 MM HG: CPT | Performed by: NURSE PRACTITIONER

## 2023-08-29 PROCEDURE — 83036 HEMOGLOBIN GLYCOSYLATED A1C: CPT

## 2023-08-29 PROCEDURE — 85027 COMPLETE CBC AUTOMATED: CPT

## 2023-08-29 PROCEDURE — 3078F DIAST BP <80 MM HG: CPT | Performed by: NURSE PRACTITIONER

## 2023-08-29 PROCEDURE — 80053 COMPREHEN METABOLIC PANEL: CPT

## 2023-08-29 PROCEDURE — 1123F ACP DISCUSS/DSCN MKR DOCD: CPT | Performed by: NURSE PRACTITIONER

## 2023-08-29 RX ORDER — ATORVASTATIN CALCIUM 80 MG/1
80 TABLET, FILM COATED ORAL DAILY
Qty: 90 TABLET | Refills: 3 | Status: SHIPPED | OUTPATIENT
Start: 2023-08-29

## 2023-08-29 RX ORDER — LOSARTAN POTASSIUM 50 MG/1
TABLET ORAL
Qty: 90 TABLET | Refills: 3 | Status: SHIPPED | OUTPATIENT
Start: 2023-08-29

## 2023-08-29 RX ORDER — PIOGLITAZONEHYDROCHLORIDE 45 MG/1
TABLET ORAL
Qty: 90 TABLET | Refills: 3 | Status: SHIPPED | OUTPATIENT
Start: 2023-08-29

## 2023-08-29 ASSESSMENT — ENCOUNTER SYMPTOMS: CONSTIPATION: 0

## 2023-10-09 ENCOUNTER — TELEPHONE (OUTPATIENT)
Dept: FAMILY MEDICINE CLINIC | Age: 68
End: 2023-10-09

## 2023-10-09 NOTE — TELEPHONE ENCOUNTER
Patient calling to report \"machine malfunction\" on his Freestyle Rod 2. Advised he will need to contact [employer] regarding. Ph# of [phone] given to patient. He voices understanding.

## 2023-11-06 ENCOUNTER — TELEPHONE (OUTPATIENT)
Dept: FAMILY MEDICINE CLINIC | Age: 68
End: 2023-11-06

## 2023-11-06 NOTE — TELEPHONE ENCOUNTER
Patient called and wanted to know if you advise him to get the RSV vaccine. Patient stated we can leave the message on his voicemail. He will get the message.

## 2023-12-02 ENCOUNTER — HOSPITAL ENCOUNTER (EMERGENCY)
Age: 68
Discharge: HOME | End: 2023-12-02
Attending: FAMILY MEDICINE
Payer: MEDICARE

## 2023-12-02 ENCOUNTER — APPOINTMENT (OUTPATIENT)
Dept: GENERAL RADIOLOGY | Age: 68
End: 2023-12-02
Payer: MEDICARE

## 2023-12-02 VITALS
TEMPERATURE: 98.2 F | BODY MASS INDEX: 36.15 KG/M2 | SYSTOLIC BLOOD PRESSURE: 126 MMHG | DIASTOLIC BLOOD PRESSURE: 71 MMHG | RESPIRATION RATE: 19 BRPM | HEART RATE: 103 BPM | OXYGEN SATURATION: 97 % | WEIGHT: 274 LBS

## 2023-12-02 DIAGNOSIS — R07.89 OTHER CHEST PAIN: Primary | ICD-10-CM

## 2023-12-02 DIAGNOSIS — D64.9 ANEMIA, UNSPECIFIED TYPE: ICD-10-CM

## 2023-12-02 LAB
ALBUMIN SERPL BCG-MCNC: 3.5 G/DL (ref 3.5–5.1)
ALP SERPL-CCNC: 108 U/L (ref 38–126)
ALT SERPL W/O P-5'-P-CCNC: 12 U/L (ref 11–66)
ANION GAP SERPL CALC-SCNC: 9 MEQ/L (ref 8–16)
ANISOCYTOSIS BLD QL SMEAR: PRESENT
AST SERPL-CCNC: 14 U/L (ref 5–40)
B-OH-BUTYR SERPL-MSCNC: 0.91 MG/DL (ref 0.2–2.81)
BASOPHILS ABSOLUTE: 0.2 THOU/MM3 (ref 0–0.1)
BASOPHILS NFR BLD AUTO: 2 %
BILIRUB CONJ SERPL-MCNC: < 0.2 MG/DL (ref 0–0.3)
BILIRUB SERPL-MCNC: 0.2 MG/DL (ref 0.3–1.2)
BUN SERPL-MCNC: 11 MG/DL (ref 7–22)
CALCIUM SERPL-MCNC: 8.9 MG/DL (ref 8.5–10.5)
CHLORIDE SERPL-SCNC: 100 MEQ/L (ref 98–111)
CO2 SERPL-SCNC: 28 MEQ/L (ref 23–33)
CREAT SERPL-MCNC: 1.3 MG/DL (ref 0.4–1.2)
D DIMER PPP IA.FEU-MCNC: 680 NG/ML FEU (ref 0–500)
DEPRECATED RDW RBC AUTO: 44.1 FL (ref 35–45)
EKG ATRIAL RATE: 100 BPM
EKG P AXIS: 61 DEGREES
EKG P-R INTERVAL: 164 MS
EKG Q-T INTERVAL: 382 MS
EKG QRS DURATION: 134 MS
EKG QTC CALCULATION (BAZETT): 492 MS
EKG R AXIS: 56 DEGREES
EKG T AXIS: 32 DEGREES
EKG VENTRICULAR RATE: 100 BPM
EOSINOPHIL NFR BLD AUTO: 0.3 %
EOSINOPHILS ABSOLUTE: 0 THOU/MM3 (ref 0–0.4)
ERYTHROCYTE [DISTWIDTH] IN BLOOD BY AUTOMATED COUNT: 15.7 % (ref 11.5–14.5)
FLUAV RNA RESP QL NAA+PROBE: NOT DETECTED
FLUBV RNA RESP QL NAA+PROBE: NOT DETECTED
GFR SERPL CREATININE-BSD FRML MDRD: 60 ML/MIN/1.73M2
GLUCOSE SERPL-MCNC: 250 MG/DL (ref 70–108)
HCT VFR BLD AUTO: 33.9 % (ref 42–52)
HGB BLD-MCNC: 9.7 GM/DL (ref 14–18)
HYPOCHROMIA BLD QL SMEAR: PRESENT
IMM GRANULOCYTES # BLD AUTO: 0.02 THOU/MM3 (ref 0–0.07)
IMM GRANULOCYTES NFR BLD AUTO: 0.2 %
LYMPHOCYTES ABSOLUTE: 1 THOU/MM3 (ref 1–4.8)
LYMPHOCYTES NFR BLD AUTO: 11.2 %
MCH RBC QN AUTO: 22.3 PG (ref 26–33)
MCHC RBC AUTO-ENTMCNC: 28.6 GM/DL (ref 32.2–35.5)
MCV RBC AUTO: 77.9 FL (ref 80–94)
MONOCYTES ABSOLUTE: 0.7 THOU/MM3 (ref 0.4–1.3)
MONOCYTES NFR BLD AUTO: 8.7 %
NEUTROPHILS NFR BLD AUTO: 77.6 %
NRBC BLD AUTO-RTO: 0 /100 WBC
OSMOLALITY SERPL CALC.SUM OF ELEC: 281.6 MOSMOL/KG (ref 275–300)
PLATELET # BLD AUTO: 432 THOU/MM3 (ref 130–400)
PMV BLD AUTO: 10.1 FL (ref 9.4–12.4)
POLYCHROMASIA BLD QL SMEAR: ABNORMAL
POTASSIUM SERPL-SCNC: 4.8 MEQ/L (ref 3.5–5.2)
PROT SERPL-MCNC: 7 G/DL (ref 6.1–8)
RBC # BLD AUTO: 4.35 MILL/MM3 (ref 4.7–6.1)
SARS-COV-2 RNA RESP QL NAA+PROBE: NOT DETECTED
SCAN OF BLOOD SMEAR: NORMAL
SEGMENTED NEUTROPHILS ABSOLUTE COUNT: 6.7 THOU/MM3 (ref 1.8–7.7)
SODIUM SERPL-SCNC: 137 MEQ/L (ref 135–145)
TROPONIN, HIGH SENSITIVITY: 12 NG/L (ref 0–12)
WBC # BLD AUTO: 8.6 THOU/MM3 (ref 4.8–10.8)

## 2023-12-02 PROCEDURE — 96360 HYDRATION IV INFUSION INIT: CPT

## 2023-12-02 PROCEDURE — 80053 COMPREHEN METABOLIC PANEL: CPT

## 2023-12-02 PROCEDURE — 36415 COLL VENOUS BLD VENIPUNCTURE: CPT

## 2023-12-02 PROCEDURE — 85379 FIBRIN DEGRADATION QUANT: CPT

## 2023-12-02 PROCEDURE — 93010 ELECTROCARDIOGRAM REPORT: CPT | Performed by: INTERNAL MEDICINE

## 2023-12-02 PROCEDURE — 99285 EMERGENCY DEPT VISIT HI MDM: CPT

## 2023-12-02 PROCEDURE — 85025 COMPLETE CBC W/AUTO DIFF WBC: CPT

## 2023-12-02 PROCEDURE — 71045 X-RAY EXAM CHEST 1 VIEW: CPT

## 2023-12-02 PROCEDURE — 93005 ELECTROCARDIOGRAM TRACING: CPT | Performed by: STUDENT IN AN ORGANIZED HEALTH CARE EDUCATION/TRAINING PROGRAM

## 2023-12-02 PROCEDURE — 82248 BILIRUBIN DIRECT: CPT

## 2023-12-02 PROCEDURE — 84484 ASSAY OF TROPONIN QUANT: CPT

## 2023-12-02 PROCEDURE — 87636 SARSCOV2 & INF A&B AMP PRB: CPT

## 2023-12-02 PROCEDURE — 82010 KETONE BODYS QUAN: CPT

## 2023-12-02 PROCEDURE — 96361 HYDRATE IV INFUSION ADD-ON: CPT

## 2023-12-02 PROCEDURE — 2580000003 HC RX 258: Performed by: STUDENT IN AN ORGANIZED HEALTH CARE EDUCATION/TRAINING PROGRAM

## 2023-12-02 RX ORDER — 0.9 % SODIUM CHLORIDE 0.9 %
750 INTRAVENOUS SOLUTION INTRAVENOUS ONCE
Status: COMPLETED | OUTPATIENT
Start: 2023-12-02 | End: 2023-12-02

## 2023-12-02 RX ADMIN — SODIUM CHLORIDE 750 ML: 9 INJECTION, SOLUTION INTRAVENOUS at 18:30

## 2023-12-02 ASSESSMENT — ENCOUNTER SYMPTOMS
CHEST TIGHTNESS: 0
ABDOMINAL DISTENTION: 0
CONSTIPATION: 0
NAUSEA: 0
SHORTNESS OF BREATH: 0
VOMITING: 0
ABDOMINAL PAIN: 0
DIARRHEA: 0
SORE THROAT: 0
RHINORRHEA: 0
COUGH: 0

## 2023-12-02 ASSESSMENT — PAIN - FUNCTIONAL ASSESSMENT: PAIN_FUNCTIONAL_ASSESSMENT: 0-10

## 2023-12-02 ASSESSMENT — PAIN SCALES - GENERAL: PAINLEVEL_OUTOF10: 0

## 2023-12-02 NOTE — ED NOTES
Pt arrived to ED via EMS with complaints of chest tightness while shopping. EKG completed and IV access obtained. Telemetry applied. Pt appears to be in stable condition with respirations even and unlabored. Pts call light in reach.        Aj Fernandes, SAULO  12/02/23 2139

## 2023-12-02 NOTE — ED NOTES
Pt resting quietly on cot in stable condition. Denies any needs at this time. Call light in reach.       Aj Fernandes, SAULO  12/02/23 1980

## 2023-12-02 NOTE — ED PROVIDER NOTES
[facility] EMERGENCY DEPT      EMERGENCY MEDICINE     Pt Name: Eliel Huerta  MRN: [id number]  [address] [facility] 1955  Date of evaluation: 12/2/2023  Provider: Ion Calvo MD    CHIEF COMPLAINT       Chief Complaint   Patient presents with    Chest Pain    Shortness of Breath     HISTORY OF PRESENT ILLNESS   Eliel Huerta is a pleasant 80 y.o. male who presents to the emergency department from from home, by private vehicle for evaluation of chest pain. Patient presents emergency department complaining of 1 episode of chest pain described as pressure, 3/10 intensity, nonradiated, associated with mild shortness of breath; also patient states before episode of chest pain patient has had episode of blurry vision associated with low blood sugar by patient but with high blood sugar by EMS; patient denies sweating, no nausea, no emesis, no diaphoresis, first episode of chest pain. At arriving patient is asymptomatic for chest pain shortness of breath or other symptoms. Sarah Dyer PASTMEDICAL HISTORY     Past Medical History:   Diagnosis Date    CVA (cerebral vascular accident) ([address]) 06/28/2015    Diabetes ([address])     Hypertension     Kidney stones 1986       Patient Active Problem List   Diagnosis Code    Controlled type 2 diabetes mellitus without complication, without long-term current use of insulin ([address]) E11.9    Essential hypertension I10    History of CVA (cerebrovascular accident) Z86.73    Mixed hyperlipidemia E78.2    Obesity (BMI 30-39. 9) E66.9    Acute UTI N39.0    Hypoglycemia E16.2    Unspecified abdominal pain R10.9    Dizziness and giddiness R42     SURGICAL HISTORY       Past Surgical History:   Procedure Laterality Date    COLONOSCOPY      TONSILLECTOMY      1963    TUMOR EXCISION Left 2009?     mid back, benign       CURRENT MEDICATIONS       Previous Medications    ATORVASTATIN (LIPITOR) 80 MG TABLET    Take 1 tablet by mouth daily    CONTINUOUS BLOOD GLUC SENSOR (FREESTYLE RUMA 2 SENSOR) [facility]

## 2023-12-02 NOTE — H&P
Pressure Father     Diabetes Maternal Grandmother     Heart Disease Paternal Grandmother     Heart Disease Paternal Grandfather            Previous records reviewed:  12/2/23 . Vitals:    12/02/23 1940   BP: 126/71   Pulse: (!) 103   Resp: 19   Temp:    SpO2:      Vital signs and nursing assessment reviewed and normal. Pulsoximetry is normal per my interpretation. Physical Exam  Constitutional:       Appearance: He is well-developed. He is obese. HENT:      Head: Normocephalic and atraumatic. Eyes:      Extraocular Movements: Extraocular movements intact. Cardiovascular:      Rate and Rhythm: Normal rate and regular rhythm. Pulses: Normal pulses. Heart sounds: Normal heart sounds. Pulmonary:      Effort: Pulmonary effort is normal.      Breath sounds: Normal breath sounds. Chest:      Chest wall: No tenderness. Abdominal:      Palpations: Abdomen is soft. Tenderness: There is no guarding or rebound. Musculoskeletal:         General: Normal range of motion. Cervical back: Normal range of motion. Right lower leg: No tenderness. No edema. Left lower leg: No tenderness. No edema. Skin:     General: Skin is warm and dry. Capillary Refill: Capillary refill takes less than 2 seconds. Neurological:      General: No focal deficit present. Mental Status: He is alert and oriented to person, place, and time. Psychiatric:         Mood and Affect: Mood normal.         Behavior: Behavior normal.             MDM  Initial Assessment: Differentials include but not limited to: Hyperglycemic episode, PE, Pneumonia, Tension pneumothorax, Esophageal Rupture, Cardiac tamponade, MI, ACS, Aortic dissection. Plan: CBC, CMP, Chest XR, Trop, EKG, Fluids, UA with reflex to culture, D-Dimer, Beta-hydroxybutyrate, COVID/Flu  Case and management plan discussed with Dr. Ilir Mir.     Reassessment: XR with no acute findings, CBC/CMP unremarkable except with decreasing signed by Leesa Snow on 12/2/2023 at 5:28 PM       **This is a Medical/PA/APRN Student Note and is charted for educational purposes. The non-physician staff attested note is not to be used for billing purposes, chart documentation or to guide patient care. Please see the supervising physician/PA/APRN modifications/attestation for treatment plan/chart documentation/suggestions. This note has been reviewed and feedback has been provided to the student.  **

## 2023-12-04 ENCOUNTER — TELEPHONE (OUTPATIENT)
Dept: FAMILY MEDICINE CLINIC | Age: 68
End: 2023-12-04

## 2023-12-07 ENCOUNTER — HOSPITAL ENCOUNTER (OUTPATIENT)
Age: 68
Discharge: HOME | End: 2023-12-07
Payer: MEDICARE

## 2023-12-07 ENCOUNTER — OFFICE VISIT (OUTPATIENT)
Dept: FAMILY MEDICINE CLINIC | Age: 68
End: 2023-12-07
Payer: MEDICARE

## 2023-12-07 VITALS
HEART RATE: 68 BPM | WEIGHT: 282.4 LBS | DIASTOLIC BLOOD PRESSURE: 76 MMHG | BODY MASS INDEX: 37.43 KG/M2 | RESPIRATION RATE: 16 BRPM | HEIGHT: 73 IN | SYSTOLIC BLOOD PRESSURE: 128 MMHG

## 2023-12-07 DIAGNOSIS — E78.2 MIXED HYPERLIPIDEMIA: ICD-10-CM

## 2023-12-07 DIAGNOSIS — K59.00 CONSTIPATION, UNSPECIFIED CONSTIPATION TYPE: ICD-10-CM

## 2023-12-07 DIAGNOSIS — D64.9 ACUTE ANEMIA: ICD-10-CM

## 2023-12-07 DIAGNOSIS — I10 ESSENTIAL HYPERTENSION: ICD-10-CM

## 2023-12-07 DIAGNOSIS — E11.65 UNCONTROLLED TYPE 2 DIABETES MELLITUS WITH HYPERGLYCEMIA (HCC): ICD-10-CM

## 2023-12-07 DIAGNOSIS — E11.65 UNCONTROLLED TYPE 2 DIABETES MELLITUS WITH HYPERGLYCEMIA (HCC): Primary | ICD-10-CM

## 2023-12-07 DIAGNOSIS — E66.01 SEVERE OBESITY (BMI 35.0-39.9) WITH COMORBIDITY (HCC): ICD-10-CM

## 2023-12-07 DIAGNOSIS — D50.9 MICROCYTIC ANEMIA: ICD-10-CM

## 2023-12-07 LAB
DEPRECATED MEAN GLUCOSE BLD GHB EST-ACNC: 222 MG/DL (ref 70–126)
FERRITIN SERPL IA-MCNC: 8 NG/ML (ref 22–322)
HBA1C MFR BLD HPLC: 9.4 % (ref 4.4–6.4)

## 2023-12-07 PROCEDURE — 83036 HEMOGLOBIN GLYCOSYLATED A1C: CPT

## 2023-12-07 PROCEDURE — 82728 ASSAY OF FERRITIN: CPT

## 2023-12-07 PROCEDURE — 3052F HG A1C>EQUAL 8.0%<EQUAL 9.0%: CPT | Performed by: NURSE PRACTITIONER

## 2023-12-07 PROCEDURE — 99214 OFFICE O/P EST MOD 30 MIN: CPT | Performed by: NURSE PRACTITIONER

## 2023-12-07 PROCEDURE — 3078F DIAST BP <80 MM HG: CPT | Performed by: NURSE PRACTITIONER

## 2023-12-07 PROCEDURE — 1123F ACP DISCUSS/DSCN MKR DOCD: CPT | Performed by: NURSE PRACTITIONER

## 2023-12-07 PROCEDURE — 3074F SYST BP LT 130 MM HG: CPT | Performed by: NURSE PRACTITIONER

## 2023-12-07 PROCEDURE — 36415 COLL VENOUS BLD VENIPUNCTURE: CPT

## 2023-12-07 RX ORDER — FERROUS SULFATE 325(65) MG
325 TABLET ORAL 2 TIMES DAILY
Qty: 180 TABLET | Refills: 1 | Status: SHIPPED | OUTPATIENT
Start: 2023-12-07

## 2023-12-07 RX ORDER — DOCUSATE SODIUM 100 MG/1
100 CAPSULE, LIQUID FILLED ORAL 2 TIMES DAILY
Qty: 60 CAPSULE | Refills: 11 | Status: SHIPPED | OUTPATIENT
Start: 2023-12-07 | End: 2024-12-01

## 2023-12-07 RX ORDER — INSULIN ASPART 100 [IU]/ML
INJECTION, SOLUTION INTRAVENOUS; SUBCUTANEOUS
Qty: 15 ML | Refills: 3 | Status: SHIPPED | OUTPATIENT
Start: 2023-12-07

## 2023-12-07 ASSESSMENT — PATIENT HEALTH QUESTIONNAIRE - PHQ9
SUM OF ALL RESPONSES TO PHQ QUESTIONS 1-9: 0
2. FEELING DOWN, DEPRESSED OR HOPELESS: 0
SUM OF ALL RESPONSES TO PHQ9 QUESTIONS 1 & 2: 0
1. LITTLE INTEREST OR PLEASURE IN DOING THINGS: 0
SUM OF ALL RESPONSES TO PHQ QUESTIONS 1-9: 0

## 2023-12-07 ASSESSMENT — ENCOUNTER SYMPTOMS: CONSTIPATION: 0

## 2023-12-08 ENCOUNTER — TELEPHONE (OUTPATIENT)
Dept: FAMILY MEDICINE CLINIC | Age: 68
End: 2023-12-08

## 2023-12-08 NOTE — TELEPHONE ENCOUNTER
Received a fax regarding patient novolog flexpen stating it needed prior auth    Prior auth completed on cover my meds and was approved. [facility] aid and informed. Called patient and left informing patient medication was covered and will be filled by the pharmacy.

## 2024-01-15 ENCOUNTER — HOSPITAL ENCOUNTER (EMERGENCY)
Age: 69
Discharge: HOME | End: 2024-01-15
Attending: FAMILY MEDICINE
Payer: MEDICARE

## 2024-01-15 VITALS
DIASTOLIC BLOOD PRESSURE: 56 MMHG | HEART RATE: 97 BPM | SYSTOLIC BLOOD PRESSURE: 109 MMHG | TEMPERATURE: 97.7 F | OXYGEN SATURATION: 99 % | RESPIRATION RATE: 16 BRPM

## 2024-01-15 DIAGNOSIS — Z79.4 TYPE 2 DIABETES MELLITUS WITH HYPERGLYCEMIA, WITH LONG-TERM CURRENT USE OF INSULIN (HCC): Primary | ICD-10-CM

## 2024-01-15 DIAGNOSIS — E11.65 TYPE 2 DIABETES MELLITUS WITH HYPERGLYCEMIA, WITH LONG-TERM CURRENT USE OF INSULIN (HCC): Primary | ICD-10-CM

## 2024-01-15 LAB
ALBUMIN SERPL BCG-MCNC: 3.9 G/DL (ref 3.5–5.1)
ALP SERPL-CCNC: 133 U/L (ref 38–126)
ALT SERPL W/O P-5'-P-CCNC: 13 U/L (ref 11–66)
ANION GAP SERPL CALC-SCNC: 14 MEQ/L (ref 8–16)
AST SERPL-CCNC: 16 U/L (ref 5–40)
BASOPHILS ABSOLUTE: 0.2 THOU/MM3 (ref 0–0.1)
BASOPHILS NFR BLD AUTO: 1.8 %
BILIRUB SERPL-MCNC: 0.4 MG/DL (ref 0.3–1.2)
BUN SERPL-MCNC: 10 MG/DL (ref 7–22)
CALCIUM SERPL-MCNC: 9.4 MG/DL (ref 8.5–10.5)
CHLORIDE SERPL-SCNC: 100 MEQ/L (ref 98–111)
CO2 SERPL-SCNC: 23 MEQ/L (ref 23–33)
CREAT SERPL-MCNC: 1.2 MG/DL (ref 0.4–1.2)
DEPRECATED RDW RBC AUTO: 44.3 FL (ref 35–45)
EOSINOPHIL NFR BLD AUTO: 0.2 %
EOSINOPHILS ABSOLUTE: 0 THOU/MM3 (ref 0–0.4)
ERYTHROCYTE [DISTWIDTH] IN BLOOD BY AUTOMATED COUNT: 16.8 % (ref 11.5–14.5)
GFR SERPL CREATININE-BSD FRML MDRD: > 60 ML/MIN/1.73M2
GLUCOSE BLD STRIP.AUTO-MCNC: 251 MG/DL (ref 70–108)
GLUCOSE SERPL-MCNC: 127 MG/DL (ref 70–108)
HCT VFR BLD AUTO: 35.2 % (ref 42–52)
HGB BLD-MCNC: 10.1 GM/DL (ref 14–18)
HYPOCHROMIA BLD QL SMEAR: PRESENT
IMM GRANULOCYTES # BLD AUTO: 0.02 THOU/MM3 (ref 0–0.07)
IMM GRANULOCYTES NFR BLD AUTO: 0.2 %
LYMPHOCYTES ABSOLUTE: 0.9 THOU/MM3 (ref 1–4.8)
LYMPHOCYTES NFR BLD AUTO: 10.9 %
MCH RBC QN AUTO: 21.1 PG (ref 26–33)
MCHC RBC AUTO-ENTMCNC: 28.7 GM/DL (ref 32.2–35.5)
MCV RBC AUTO: 73.6 FL (ref 80–94)
MONOCYTES ABSOLUTE: 0.5 THOU/MM3 (ref 0.4–1.3)
MONOCYTES NFR BLD AUTO: 5.5 %
NEUTROPHILS NFR BLD AUTO: 81.4 %
NRBC BLD AUTO-RTO: 0 /100 WBC
OSMOLALITY SERPL CALC.SUM OF ELEC: 274.4 MOSMOL/KG (ref 275–300)
PLATELET # BLD AUTO: 514 THOU/MM3 (ref 130–400)
PMV BLD AUTO: 10.1 FL (ref 9.4–12.4)
POTASSIUM SERPL-SCNC: 3.9 MEQ/L (ref 3.5–5.2)
PROT SERPL-MCNC: 7.7 G/DL (ref 6.1–8)
RBC # BLD AUTO: 4.78 MILL/MM3 (ref 4.7–6.1)
SCAN OF BLOOD SMEAR: NORMAL
SEGMENTED NEUTROPHILS ABSOLUTE COUNT: 7.1 THOU/MM3 (ref 1.8–7.7)
SODIUM SERPL-SCNC: 137 MEQ/L (ref 135–145)
WBC # BLD AUTO: 8.7 THOU/MM3 (ref 4.8–10.8)

## 2024-01-15 PROCEDURE — 80053 COMPREHEN METABOLIC PANEL: CPT

## 2024-01-15 PROCEDURE — 36415 COLL VENOUS BLD VENIPUNCTURE: CPT

## 2024-01-15 PROCEDURE — 99284 EMERGENCY DEPT VISIT MOD MDM: CPT

## 2024-01-15 PROCEDURE — 93005 ELECTROCARDIOGRAM TRACING: CPT

## 2024-01-15 PROCEDURE — 85025 COMPLETE CBC W/AUTO DIFF WBC: CPT

## 2024-01-15 PROCEDURE — 82948 REAGENT STRIP/BLOOD GLUCOSE: CPT

## 2024-01-15 ASSESSMENT — ENCOUNTER SYMPTOMS
SINUS PAIN: 0
APNEA: 0
DIARRHEA: 0
VOMITING: 0
NAUSEA: 0
COLOR CHANGE: 0
SORE THROAT: 0
CHEST TIGHTNESS: 0
WHEEZING: 0
RHINORRHEA: 0
SINUS PRESSURE: 0
EYE PAIN: 0
CONSTIPATION: 0

## 2024-01-15 ASSESSMENT — PAIN - FUNCTIONAL ASSESSMENT: PAIN_FUNCTIONAL_ASSESSMENT: NONE - DENIES PAIN

## 2024-01-16 ENCOUNTER — TELEPHONE (OUTPATIENT)
Dept: FAMILY MEDICINE CLINIC | Age: 69
End: 2024-01-16

## 2024-01-16 PROCEDURE — 93010 ELECTROCARDIOGRAM REPORT: CPT | Performed by: NUCLEAR MEDICINE

## 2024-01-16 NOTE — ED PROVIDER NOTES
dictated by use of voice recognition software and electronically transcribed. The transcription may contain errors not detected in proofreading.

## 2024-01-16 NOTE — ED NOTES
Pt to er. Pt states he was eating at [employer] and felt like his BS was high.  went out to his car and took some fast acting insuliin and his lantus.  came to ER because he just wanted to see what his BS was.  feels better now.

## 2024-01-16 NOTE — DISCHARGE INSTRUCTIONS
You were seen and evaluated in the ED today for complaint of hyperglycemia.    You were diagnosed with hyperglycemia at today's visit.    Please follow-up with your primary care physician as soon as possible for a visit.    Take your medication as indicated and prescribed.  Check your blood sugar at minimum 2 - 3 times per day and write down the results to take to your physician    PLEASE RETURN TO THE EMERGENCY DEPARTMENT IMMEDIATELY for worsening symptoms, persistent elevated blood sugar, low blood sugar, or if you develop any concerning symptoms such as: high fever not relieved by acetaminophen (Tylenol) and/or ibuprofen (Motrin / Advil), chills, shortness of breath, chest pain, feeling of your heart fluttering or racing, persistent nausea and/or vomiting, vomiting up blood, blood in your stool, numbness, loss of consciousness, weakness or tingling in the arms or legs or change in color of the extremities, changes in mental status, persistent headache, blurry vision, loss of bladder / bowel control, unable to follow up with your physician, or other any other care or concern.

## 2024-01-18 ENCOUNTER — TELEPHONE (OUTPATIENT)
Dept: FAMILY MEDICINE CLINIC | Age: 69
End: 2024-01-18

## 2024-01-18 DIAGNOSIS — E11.65 UNCONTROLLED TYPE 2 DIABETES MELLITUS WITH HYPERGLYCEMIA (HCC): ICD-10-CM

## 2024-01-18 LAB
EKG ATRIAL RATE: 83 BPM
EKG P AXIS: 55 DEGREES
EKG P-R INTERVAL: 168 MS
EKG Q-T INTERVAL: 396 MS
EKG QRS DURATION: 132 MS
EKG QTC CALCULATION (BAZETT): 465 MS
EKG R AXIS: 57 DEGREES
EKG T AXIS: 35 DEGREES
EKG VENTRICULAR RATE: 83 BPM

## 2024-01-18 NOTE — TELEPHONE ENCOUNTER
Spoke with patient and he did not go and get his blood pressure checked at pharmacy. The bilateral blurred vision resolved and he is feeling and seeing just fine now.  He would like an appt to evaluate the blurred vision that has resolved and have Uriel check me out.  Appt given for Monday.  Please refill BS supplies.

## 2024-01-18 NOTE — TELEPHONE ENCOUNTER
Patient calling with c/o vision changes since this am.  Patient unable to check his BS or B/P.  He is out of test strips and rod sensors and does not have B/P monitor.  He uses [employer] Classic Test Strips but also uses his Rod.  Denies HA.  Patient's pharmacy is [facility] [place].  He is headed to pharmacy now to get his blood pressure checked.  He will call office with results.  Please advise

## 2024-01-18 NOTE — TELEPHONE ENCOUNTER
When patient calls back get more detail on vision changes - if blurry and bilateral ok to schedule and likely related to sugars.  If vision is unilateral, double, vision loss (black spots), HA and/or BP elevated go to ED

## 2024-01-23 ENCOUNTER — OFFICE VISIT (OUTPATIENT)
Dept: FAMILY MEDICINE CLINIC | Age: 69
End: 2024-01-23
Payer: MEDICARE

## 2024-01-23 VITALS
BODY MASS INDEX: 36.15 KG/M2 | RESPIRATION RATE: 20 BRPM | DIASTOLIC BLOOD PRESSURE: 58 MMHG | WEIGHT: 274 LBS | SYSTOLIC BLOOD PRESSURE: 104 MMHG | HEART RATE: 96 BPM

## 2024-01-23 DIAGNOSIS — I10 ESSENTIAL HYPERTENSION: ICD-10-CM

## 2024-01-23 DIAGNOSIS — D50.9 MICROCYTIC ANEMIA: ICD-10-CM

## 2024-01-23 DIAGNOSIS — E11.65 UNCONTROLLED TYPE 2 DIABETES MELLITUS WITH HYPERGLYCEMIA (HCC): Primary | ICD-10-CM

## 2024-01-23 DIAGNOSIS — Z86.73 HISTORY OF CVA (CEREBROVASCULAR ACCIDENT): ICD-10-CM

## 2024-01-23 DIAGNOSIS — D64.9 ACUTE ANEMIA: ICD-10-CM

## 2024-01-23 DIAGNOSIS — E78.2 MIXED HYPERLIPIDEMIA: ICD-10-CM

## 2024-01-23 DIAGNOSIS — E66.01 SEVERE OBESITY (BMI 35.0-39.9) WITH COMORBIDITY (HCC): ICD-10-CM

## 2024-01-23 PROCEDURE — 1123F ACP DISCUSS/DSCN MKR DOCD: CPT | Performed by: NURSE PRACTITIONER

## 2024-01-23 PROCEDURE — 99214 OFFICE O/P EST MOD 30 MIN: CPT | Performed by: NURSE PRACTITIONER

## 2024-01-23 PROCEDURE — 3074F SYST BP LT 130 MM HG: CPT | Performed by: NURSE PRACTITIONER

## 2024-01-23 PROCEDURE — 3078F DIAST BP <80 MM HG: CPT | Performed by: NURSE PRACTITIONER

## 2024-01-23 RX ORDER — BLOOD-GLUCOSE METER
1 EACH MISCELLANEOUS 4 TIMES DAILY
Qty: 300 EACH | Refills: 3 | Status: SHIPPED | OUTPATIENT
Start: 2024-01-23

## 2024-01-23 RX ORDER — INSULIN ASPART 100 [IU]/ML
INJECTION, SOLUTION INTRAVENOUS; SUBCUTANEOUS
Qty: 15 ML | Refills: 3
Start: 2024-01-23

## 2024-01-23 RX ORDER — INSULIN GLARGINE 100 [IU]/ML
INJECTION, SOLUTION SUBCUTANEOUS
Qty: 15 ML | Refills: 1 | Status: SHIPPED | OUTPATIENT
Start: 2024-01-23

## 2024-01-23 ASSESSMENT — PATIENT HEALTH QUESTIONNAIRE - PHQ9
SUM OF ALL RESPONSES TO PHQ9 QUESTIONS 1 & 2: 1
2. FEELING DOWN, DEPRESSED OR HOPELESS: 1
SUM OF ALL RESPONSES TO PHQ QUESTIONS 1-9: 1
1. LITTLE INTEREST OR PLEASURE IN DOING THINGS: 0
SUM OF ALL RESPONSES TO PHQ QUESTIONS 1-9: 1

## 2024-01-23 ASSESSMENT — ENCOUNTER SYMPTOMS: CONSTIPATION: 0

## 2024-01-23 NOTE — PROGRESS NOTES
Subjective:      Patient ID: Richard Soares is a 68 y.o. male.    HPI: 6 month Follow Up    Chief Complaint   Patient presents with    Blurred Vision     Happens when patient has low blood sugar, occurring right now at visit, has not checked sugar due to lack of supplies    Medication Refill       He is to be on Metformin 1000 mg BID, Jardiance 25 mg,  Actos 45 mg and  On Lantus 30 units BID and Novolog 15 u TID.  Has been having some low glucose levels and he will get blurry vision     Has not been taking Trulicity due to shortage.      No longer following with [facility] Clinic - last seen Spring 2022.  Has CGM and is taking his insulin now since he is able to check his BS    Hemoglobin A1C   Date Value Ref Range Status   12/07/2023 9.4 (H) 4.4 - 6.4 % Final       Hgb drops 4 points since August 2023.  Microcytic anemia.   Constipation.  Dark, hard stools.   Denies daily intake of NSAIDs.  1 month ago his Hgb was 9.7.  Was referred to [facility] but he never got an appointment.  Has not been taking his iron supplement.       Lab Results   Component Value Date    WBC 8.7 01/15/2024    HGB 10.1 (L) 01/15/2024    HCT 35.2 (L) 01/15/2024     (H) 01/15/2024       Patient Active Problem List   Diagnosis    Controlled type 2 diabetes mellitus without complication, without long-term current use of insulin (HCC)    Essential hypertension    History of CVA (cerebrovascular accident)    Mixed hyperlipidemia    Obesity (BMI 30-39.9)    Acute UTI    Hypoglycemia    Unspecified abdominal pain    Dizziness and giddiness         COLONOSCOPY - 2016    POD - Dr. Feldman    Overall doing well.  Denies CP, SOB or chest tightness.  No smoking hx.     BP Readings from Last 3 Encounters:   01/23/24 (!) 104/58   01/15/24 (!) 109/56   12/07/23 128/76       BP wnl.  Losartan 50 mg.     Wt Readings from Last 3 Encounters:   01/23/24 124.3 kg (274 lb)   12/07/23 128.1 kg (282 lb 6.4 oz)   12/02/23 124.3 kg (274 lb)       Labs reviewed.

## 2024-02-13 ENCOUNTER — HOSPITAL ENCOUNTER (EMERGENCY)
Age: 69
Discharge: HOME | End: 2024-02-13
Attending: EMERGENCY MEDICINE
Payer: MEDICARE

## 2024-02-13 VITALS
OXYGEN SATURATION: 100 % | SYSTOLIC BLOOD PRESSURE: 135 MMHG | DIASTOLIC BLOOD PRESSURE: 62 MMHG | WEIGHT: 274 LBS | RESPIRATION RATE: 16 BRPM | TEMPERATURE: 98.5 F | BODY MASS INDEX: 36.15 KG/M2 | HEART RATE: 98 BPM

## 2024-02-13 DIAGNOSIS — R42 LIGHTHEADEDNESS: Primary | ICD-10-CM

## 2024-02-13 LAB
ABO: NORMAL
ALBUMIN SERPL BCG-MCNC: 3.9 G/DL (ref 3.5–5.1)
ALP SERPL-CCNC: 122 U/L (ref 38–126)
ALT SERPL W/O P-5'-P-CCNC: 11 U/L (ref 11–66)
ANION GAP SERPL CALC-SCNC: 17 MEQ/L (ref 8–16)
ANTIBODY SCREEN: NORMAL
AST SERPL-CCNC: 14 U/L (ref 5–40)
BILIRUB SERPL-MCNC: 0.4 MG/DL (ref 0.3–1.2)
BUN SERPL-MCNC: 11 MG/DL (ref 7–22)
CALCIUM SERPL-MCNC: 9.3 MG/DL (ref 8.5–10.5)
CHLORIDE SERPL-SCNC: 101 MEQ/L (ref 98–111)
CO2 SERPL-SCNC: 20 MEQ/L (ref 23–33)
CREAT SERPL-MCNC: 1.2 MG/DL (ref 0.4–1.2)
GFR SERPL CREATININE-BSD FRML MDRD: > 60 ML/MIN/1.73M2
GLUCOSE BLD STRIP.AUTO-MCNC: 284 MG/DL (ref 70–108)
GLUCOSE SERPL-MCNC: 254 MG/DL (ref 70–108)
HEMOCCULT STL QL: NEGATIVE
OSMOLALITY SERPL CALC.SUM OF ELEC: 283.7 MOSMOL/KG (ref 275–300)
POTASSIUM SERPL-SCNC: 4.4 MEQ/L (ref 3.5–5.2)
PROT SERPL-MCNC: 7.4 G/DL (ref 6.1–8)
RH FACTOR: NORMAL
SCAN OF BLOOD SMEAR: NORMAL
SODIUM SERPL-SCNC: 138 MEQ/L (ref 135–145)

## 2024-02-13 PROCEDURE — 82272 OCCULT BLD FECES 1-3 TESTS: CPT

## 2024-02-13 PROCEDURE — 86900 BLOOD TYPING SEROLOGIC ABO: CPT

## 2024-02-13 PROCEDURE — 82948 REAGENT STRIP/BLOOD GLUCOSE: CPT

## 2024-02-13 PROCEDURE — 86850 RBC ANTIBODY SCREEN: CPT

## 2024-02-13 PROCEDURE — 36415 COLL VENOUS BLD VENIPUNCTURE: CPT

## 2024-02-13 PROCEDURE — 2580000003 HC RX 258

## 2024-02-13 PROCEDURE — 80053 COMPREHEN METABOLIC PANEL: CPT

## 2024-02-13 PROCEDURE — 99284 EMERGENCY DEPT VISIT MOD MDM: CPT

## 2024-02-13 PROCEDURE — 86901 BLOOD TYPING SEROLOGIC RH(D): CPT

## 2024-02-13 PROCEDURE — 85025 COMPLETE CBC W/AUTO DIFF WBC: CPT

## 2024-02-13 PROCEDURE — 93005 ELECTROCARDIOGRAM TRACING: CPT

## 2024-02-13 RX ORDER — 0.9 % SODIUM CHLORIDE 0.9 %
1000 INTRAVENOUS SOLUTION INTRAVENOUS ONCE
Status: COMPLETED | OUTPATIENT
Start: 2024-02-13 | End: 2024-02-13

## 2024-02-13 RX ORDER — ENEMA 19; 7 G/133ML; G/133ML
1 ENEMA RECTAL
Qty: 133 ML | Refills: 0 | Status: SHIPPED | OUTPATIENT
Start: 2024-02-13 | End: 2024-02-13

## 2024-02-13 RX ADMIN — SODIUM CHLORIDE 1000 ML: 9 INJECTION, SOLUTION INTRAVENOUS at 15:45

## 2024-02-13 NOTE — ED NOTES
Triage delayed due to patient taken to decon after finding bed bugs once moved over to ER cot from EMS cot

## 2024-02-13 NOTE — ED NOTES
Requesting to ambulate to restroom. Tolerated well.    - XR with chronic osteo possible acute on chronic   - MRI showing osteomyelitis of right hallux, likely intraosseus abscess. , ABx as above

## 2024-02-13 NOTE — ED PROVIDER NOTES
[facility] EMERGENCY DEPT  EMERGENCY DEPARTMENT ENCOUNTER          Pt Name: Richard Soares  MRN: [id number]  Birthdate 1955  Date of evaluation: 2/13/2024  Physician: Greg Caputo MD  Supervising Attending Physician: Corwin Vizcarra DO      CHIEF COMPLAINT       Chief Complaint   Patient presents with    Dizziness    Constipation         HISTORY OF PRESENT ILLNESS    HPI  Richard Soares is a 68 y.o. male with a history of type 2 diabetes, prior TIA in 2015 on baby aspirin who presents from the GI doctor's office for pre-syncope.  Patient describes that he was following up with GI related to possible GI bleed he had in recent months.  While at the office, he was attempting to use the restroom and began to feel \"whoozy\".  Providers at GI office took patient's blood pressure and noted it to be low and sent patient in for evaluation.  Here, patient states he is feeling better. \"Whooziness\" lasted only a few minutes but now does have lower abdominal pain.  Also describes constipation and taking iron supplements for the past week,.     REVIEW OF SYSTEMS   Review of Systems  As above in HPI.    PAST MEDICAL AND SURGICAL HISTORY     Past Medical History:   Diagnosis Date    CVA (cerebral vascular accident) (HCC) 06/28/2015    Diabetes (HCC)     Hypertension     Kidney stones 1986     Past Surgical History:   Procedure Laterality Date    COLONOSCOPY      TONSILLECTOMY      1963    TUMOR EXCISION Left 2009?    mid back, benign         MEDICATIONS   No current facility-administered medications for this encounter.    Current Outpatient Medications:     sodium phosphate (FLEET) 7-19 GM/118ML, Place 1 enema rectally once as needed (constipation), Disp: 133 mL, Rfl: 0    Insulin Pen Needle 31G X 8 MM MISC, 1 each by Does not apply route 3 times daily, Disp: 300 each, Rfl: 3    insulin glargine (LANTUS SOLOSTAR) 100 UNIT/ML injection pen, INJECT 30 UNITS SUBCUTANEOUSLY TWICE A DAY, Disp: 15 mL,

## 2024-02-13 NOTE — ED NOTES
Pt adjusted in bed. Pt bed smeared with stool under patient. Pt reports it feels like its something there. Reports he did not have a BM in restroom. Dr. Caputo made aware.

## 2024-02-13 NOTE — ED TRIAGE NOTES
Presents to ER from GI specialist with after having dizzy episode at office by EMS. Reports he had sensation to go to restroom prior to having dizzy episode. Reports episode lasted a couple minutes.  He states he has been having trouble with constipation. Reports straining when having BM. Reports lower abdominal pain. Reports he was at specialist to check on abdominal pain, constipation, and low hgb. Reports he has been taking iron pills x1 week. Denies any dizziness upon arrival back to ER room for triage after decon. EKG complete. . Dr. Caputo at bedside

## 2024-02-13 NOTE — DISCHARGE INSTRUCTIONS
your enema prescriptions at your pharmacy.    Your hemoglobin was not dangerously low today.      Continue to follow with GI    Return to the ED for or new worsening symptoms such as passing out, blood in stool, chest pain.

## 2024-02-14 ENCOUNTER — TELEPHONE (OUTPATIENT)
Dept: FAMILY MEDICINE CLINIC | Age: 69
End: 2024-02-14

## 2024-02-14 LAB
BASOPHILS ABSOLUTE: 0.2 THOU/MM3 (ref 0–0.1)
BASOPHILS NFR BLD AUTO: 1.6 %
DACROCYTES: ABNORMAL
DEPRECATED RDW RBC AUTO: 43.8 FL (ref 35–45)
EKG ATRIAL RATE: 98 BPM
EKG P AXIS: 59 DEGREES
EKG P-R INTERVAL: 164 MS
EKG Q-T INTERVAL: 380 MS
EKG QRS DURATION: 132 MS
EKG QTC CALCULATION (BAZETT): 485 MS
EKG R AXIS: 61 DEGREES
EKG T AXIS: 31 DEGREES
EKG VENTRICULAR RATE: 98 BPM
EOSINOPHIL NFR BLD AUTO: 0.2 %
EOSINOPHILS ABSOLUTE: 0 THOU/MM3 (ref 0–0.4)
ERYTHROCYTE [DISTWIDTH] IN BLOOD BY AUTOMATED COUNT: 17.2 % (ref 11.5–14.5)
HCT VFR BLD AUTO: 34.4 % (ref 42–52)
HGB BLD-MCNC: 9.8 GM/DL (ref 14–18)
HYPOCHROMIA BLD QL SMEAR: PRESENT
IMM GRANULOCYTES # BLD AUTO: 0.03 THOU/MM3 (ref 0–0.07)
IMM GRANULOCYTES NFR BLD AUTO: 0.3 %
LYMPHOCYTES ABSOLUTE: 0.8 THOU/MM3 (ref 1–4.8)
LYMPHOCYTES NFR BLD AUTO: 7.8 %
MCH RBC QN AUTO: 20.4 PG (ref 26–33)
MCHC RBC AUTO-ENTMCNC: 28.5 GM/DL (ref 32.2–35.5)
MCV RBC AUTO: 71.7 FL (ref 80–94)
MONOCYTES ABSOLUTE: 0.6 THOU/MM3 (ref 0.4–1.3)
MONOCYTES NFR BLD AUTO: 6.3 %
NEUTROPHILS NFR BLD AUTO: 83.8 %
NRBC BLD AUTO-RTO: 0 /100 WBC
PATHOLOGIST REVIEW: ABNORMAL
PLATELET # BLD AUTO: 498 THOU/MM3 (ref 130–400)
PLATELET BLD QL SMEAR: ADEQUATE
PMV BLD AUTO: 10.2 FL (ref 9.4–12.4)
POLYCHROMASIA BLD QL SMEAR: ABNORMAL
RBC # BLD AUTO: 4.8 MILL/MM3 (ref 4.7–6.1)
SEGMENTED NEUTROPHILS ABSOLUTE COUNT: 8.2 THOU/MM3 (ref 1.8–7.7)
WBC # BLD AUTO: 9.8 THOU/MM3 (ref 4.8–10.8)

## 2024-02-14 PROCEDURE — 93010 ELECTROCARDIOGRAM REPORT: CPT | Performed by: INTERNAL MEDICINE

## 2024-02-29 ENCOUNTER — OFFICE VISIT (OUTPATIENT)
Dept: FAMILY MEDICINE CLINIC | Age: 69
End: 2024-02-29

## 2024-02-29 VITALS
HEART RATE: 96 BPM | WEIGHT: 264.8 LBS | DIASTOLIC BLOOD PRESSURE: 60 MMHG | BODY MASS INDEX: 35.09 KG/M2 | RESPIRATION RATE: 20 BRPM | SYSTOLIC BLOOD PRESSURE: 112 MMHG | HEIGHT: 73 IN

## 2024-02-29 DIAGNOSIS — D50.9 MICROCYTIC ANEMIA: ICD-10-CM

## 2024-02-29 DIAGNOSIS — D64.9 ACUTE ANEMIA: ICD-10-CM

## 2024-02-29 DIAGNOSIS — Z86.73 HISTORY OF CVA (CEREBROVASCULAR ACCIDENT): ICD-10-CM

## 2024-02-29 DIAGNOSIS — Z00.00 MEDICARE ANNUAL WELLNESS VISIT, SUBSEQUENT: Primary | ICD-10-CM

## 2024-02-29 DIAGNOSIS — E11.65 UNCONTROLLED TYPE 2 DIABETES MELLITUS WITH HYPERGLYCEMIA (HCC): ICD-10-CM

## 2024-02-29 DIAGNOSIS — E78.2 MIXED HYPERLIPIDEMIA: ICD-10-CM

## 2024-02-29 DIAGNOSIS — E66.01 SEVERE OBESITY (BMI 35.0-39.9) WITH COMORBIDITY (HCC): ICD-10-CM

## 2024-02-29 DIAGNOSIS — I10 ESSENTIAL HYPERTENSION: ICD-10-CM

## 2024-02-29 SDOH — ECONOMIC STABILITY: INCOME INSECURITY: HOW HARD IS IT FOR YOU TO PAY FOR THE VERY BASICS LIKE FOOD, HOUSING, MEDICAL CARE, AND HEATING?: NOT HARD AT ALL

## 2024-02-29 SDOH — ECONOMIC STABILITY: FOOD INSECURITY: WITHIN THE PAST 12 MONTHS, YOU WORRIED THAT YOUR FOOD WOULD RUN OUT BEFORE YOU GOT MONEY TO BUY MORE.: NEVER TRUE

## 2024-02-29 SDOH — ECONOMIC STABILITY: FOOD INSECURITY: WITHIN THE PAST 12 MONTHS, THE FOOD YOU BOUGHT JUST DIDN'T LAST AND YOU DIDN'T HAVE MONEY TO GET MORE.: NEVER TRUE

## 2024-02-29 ASSESSMENT — PATIENT HEALTH QUESTIONNAIRE - PHQ9
SUM OF ALL RESPONSES TO PHQ QUESTIONS 1-9: 0
2. FEELING DOWN, DEPRESSED OR HOPELESS: 0
SUM OF ALL RESPONSES TO PHQ QUESTIONS 1-9: 0
SUM OF ALL RESPONSES TO PHQ QUESTIONS 1-9: 0
SUM OF ALL RESPONSES TO PHQ9 QUESTIONS 1 & 2: 0
SUM OF ALL RESPONSES TO PHQ QUESTIONS 1-9: 0
1. LITTLE INTEREST OR PLEASURE IN DOING THINGS: 0

## 2024-02-29 ASSESSMENT — ENCOUNTER SYMPTOMS
SHORTNESS OF BREATH: 0
ABDOMINAL PAIN: 0
NAUSEA: 0
CONSTIPATION: 0
COUGH: 0

## 2024-02-29 NOTE — PROGRESS NOTES
Medicare Annual Wellness Visit    Richard Soares is here for Medicare AWV, 6 Month Follow-Up, Medication Refill, and Follow-up ([facility] ED 2/13/2024)    Assessment & Plan   Medicare annual wellness visit, subsequent    Recommendations for Preventive Services Due: see orders and patient instructions/AVS.  Recommended screening schedule for the next 5-10 years is provided to the patient in written form: see Patient Instructions/AVS.     No follow-ups on file.     Subjective       Patient's complete Health Risk Assessment and screening values have been reviewed and are found in Flowsheets. The following problems were reviewed today and where indicated follow up appointments were made and/or referrals ordered.    Positive Risk Factor Screenings with Interventions:                Activity, Diet, and Weight:  On average, how many days per week do you engage in moderate to strenuous exercise (like a brisk walk)?: 3 days  On average, how many minutes do you engage in exercise at this level?: 30 min    Do you eat balanced/healthy meals regularly?: Yes    Body mass index is 34.94 kg/m². (!) Abnormal    Obesity Interventions:  low carbohydrate diet            Dentist Screen:  Have you seen the dentist within the past year?: (!) No    Intervention:  Advised to schedule with their dentist      Safety:  Do you have non-slip mats or non-slip surfaces or shower bars or grab bars in your shower or bathtub?: (!) No  Interventions:  Patient declined any further interventions or treatment     Advanced Directives:  Do you have a Living Will?: (!) No    Intervention:  has NO advanced directive - information provided                     Objective   Vitals:    02/29/24 1318   BP: 112/60   Site: Right Upper Arm   Position: Sitting   Cuff Size: Large Adult   Pulse: 96   Resp: 20   Weight: 120.1 kg (264 lb 12.8 oz)   Height: 1.854 m (6' 1\")      Body mass index is 34.94 kg/m².             Allergies   Allergen Reactions    Terramycin 
  Component Value Date    TRIG 76 04/12/2022    TRIG 142 05/27/2021    TRIG 139 04/17/2020     Lab Results   Component Value Date    HDL 43 04/12/2022    HDL 42 05/27/2021    HDL 46 04/17/2020     Lab Results   Component Value Date    LDLCALC 87 04/12/2022    LDLCALC 166 05/27/2021    LDLCALC 185 04/17/2020     No components found for: \"LABVLDL\"      Chemistry        Component Value Date/Time     02/13/2024 1446    K 4.4 02/13/2024 1446    K 3.9 01/15/2024 2038     02/13/2024 1446    CO2 20 (L) 02/13/2024 1446    BUN 11 02/13/2024 1446    CREATININE 1.2 02/13/2024 1446        Component Value Date/Time    CALCIUM 9.3 02/13/2024 1446    ALKPHOS 122 02/13/2024 1446    AST 14 02/13/2024 1446    ALT 11 02/13/2024 1446    BILITOT 0.4 02/13/2024 1446          Lab Results   Component Value Date    TSH 2.770 08/29/2023       Lab Results   Component Value Date    WBC 9.8 02/13/2024    HGB 9.8 (L) 02/13/2024    HCT 34.4 (L) 02/13/2024    MCV 71.7 (L) 02/13/2024     (H) 02/13/2024       Health Maintenance   Topic Date Due    Respiratory Syncytial Virus (RSV) Pregnant or age 60 yrs+ (1 - 1-dose 60+ series) Never done    Diabetic Alb to Cr ratio (uACR) test  04/12/2023    Lipids  04/12/2023    Diabetic foot exam  04/13/2023    Flu vaccine (1) 08/01/2023    COVID-19 Vaccine (3 - 2023-24 season) 09/01/2023    A1C test (Diabetic or Prediabetic)  03/07/2024    Diabetic retinal exam  06/09/2024    Depression Screen  01/23/2025    GFR test (Diabetes, CKD 3-4, OR last GFR 15-59)  02/13/2025    Colorectal Cancer Screen  01/09/2027    DTaP/Tdap/Td vaccine (2 - Td or Tdap) 01/11/2027    Annual Wellness Visit (Medicare Advantage)  Completed    Shingles vaccine  Completed    Pneumococcal 65+ years Vaccine  Completed    Hepatitis C screen  Completed    Hepatitis A vaccine  Aged Out    Hepatitis B vaccine  Aged Out    Hib vaccine  Aged Out    Polio vaccine  Aged Out    Meningococcal (ACWY) vaccine  Aged Out    Pneumococcal

## 2024-03-14 ENCOUNTER — CARE COORDINATION (OUTPATIENT)
Dept: CARE COORDINATION | Age: 69
End: 2024-03-14

## 2024-03-14 NOTE — CARE COORDINATION
Initial outreach attempt to reach Richard today for enrollment in care coordination.  No answer.

## 2024-03-26 ENCOUNTER — CARE COORDINATION (OUTPATIENT)
Dept: CARE COORDINATION | Age: 69
End: 2024-03-26

## 2024-03-27 ENCOUNTER — CARE COORDINATION (OUTPATIENT)
Dept: CARE COORDINATION | Age: 69
End: 2024-03-27

## 2024-03-27 NOTE — CARE COORDINATION
Final outreach attempt to reach Richard for enrollment in care coordination.  No answer.  Unable to leave .

## 2024-08-04 DIAGNOSIS — I10 ESSENTIAL HYPERTENSION: ICD-10-CM

## 2024-08-04 DIAGNOSIS — E78.2 MIXED HYPERLIPIDEMIA: ICD-10-CM

## 2024-08-04 DIAGNOSIS — E11.65 UNCONTROLLED TYPE 2 DIABETES MELLITUS WITH HYPERGLYCEMIA (HCC): ICD-10-CM

## 2024-08-05 RX ORDER — LOSARTAN POTASSIUM 50 MG/1
TABLET ORAL
Qty: 90 TABLET | Refills: 3 | Status: SHIPPED | OUTPATIENT
Start: 2024-08-05

## 2024-08-05 RX ORDER — ATORVASTATIN CALCIUM 80 MG/1
80 TABLET, FILM COATED ORAL DAILY
Qty: 90 TABLET | Refills: 3 | Status: SHIPPED | OUTPATIENT
Start: 2024-08-05

## 2024-08-29 ENCOUNTER — TELEMEDICINE (OUTPATIENT)
Dept: FAMILY MEDICINE CLINIC | Age: 69
End: 2024-08-29

## 2024-08-29 DIAGNOSIS — I10 ESSENTIAL HYPERTENSION: ICD-10-CM

## 2024-08-29 DIAGNOSIS — E11.65 UNCONTROLLED TYPE 2 DIABETES MELLITUS WITH HYPERGLYCEMIA (HCC): Primary | ICD-10-CM

## 2024-08-29 DIAGNOSIS — E66.01 SEVERE OBESITY (BMI 35.0-39.9) WITH COMORBIDITY (HCC): ICD-10-CM

## 2024-08-29 DIAGNOSIS — E78.2 MIXED HYPERLIPIDEMIA: ICD-10-CM

## 2024-08-29 DIAGNOSIS — Z12.5 SCREENING PSA (PROSTATE SPECIFIC ANTIGEN): ICD-10-CM

## 2024-08-29 DIAGNOSIS — Z86.73 HISTORY OF CVA (CEREBROVASCULAR ACCIDENT): ICD-10-CM

## 2024-08-29 RX ORDER — PIOGLITAZONEHYDROCHLORIDE 45 MG/1
TABLET ORAL
Qty: 90 TABLET | Refills: 3 | Status: SHIPPED | OUTPATIENT
Start: 2024-08-29

## 2024-08-29 RX ORDER — ACYCLOVIR 400 MG/1
1 TABLET ORAL 4 TIMES DAILY
Qty: 1 EACH | Refills: 11 | Status: SHIPPED | OUTPATIENT
Start: 2024-08-29

## 2024-08-29 RX ORDER — ACYCLOVIR 400 MG/1
1 TABLET ORAL 4 TIMES DAILY
Qty: 1 EACH | Refills: 0 | Status: SHIPPED | OUTPATIENT
Start: 2024-08-29

## 2024-08-29 ASSESSMENT — ENCOUNTER SYMPTOMS
ABDOMINAL PAIN: 0
SHORTNESS OF BREATH: 0
COUGH: 0
NAUSEA: 0

## 2024-08-29 NOTE — PROGRESS NOTES
Richard Soares is a 69 y.o. male evaluated via telephone on 8/29/2024 for 6 Month Follow-Up  .      Patient Active Problem List   Diagnosis    Controlled type 2 diabetes mellitus without complication, without long-term current use of insulin (HCC)    Essential hypertension    History of CVA (cerebrovascular accident)    Mixed hyperlipidemia    Obesity (BMI 30-39.9)    Acute UTI    Hypoglycemia    Unspecified abdominal pain    Dizziness and giddiness       COLONOSCOPY - 2024    POD - Dr. Feldman    Overall doing well.  Denies CP, SOB or chest tightness.  No smoking hx.     BP wnl.  Losartan 50 mg.     BP Readings from Last 3 Encounters:   08/12/24 (!) 143/83   06/12/24 (!) 140/80   02/29/24 112/60       On Metformin 1000 mg BID, Jardiance 25 mg,  Actos 45 mg and  On Lantus 30 units BID and Novolog 13 u TID.     On Atorvastatin 80 mg.     Due for evaluation    Lab Results   Component Value Date    LABA1C 9.4 (H) 12/07/2023    LABA1C 9.0 (H) 08/29/2023    LABA1C 8.3 08/24/2023     Lab Results   Component Value Date     (H) 12/07/2023       No components found for: \"CHLPL\"  Lab Results   Component Value Date    TRIG 76 04/12/2022    TRIG 142 05/27/2021    TRIG 139 04/17/2020     Lab Results   Component Value Date    HDL 43 04/12/2022    HDL 42 05/27/2021    HDL 46 04/17/2020     No components found for: \"LDLCALC\"  No components found for: \"LABVLDL\"      Chemistry        Component Value Date/Time     02/13/2024 1446    K 4.4 02/13/2024 1446    K 3.9 01/15/2024 2038     02/13/2024 1446    CO2 20 (L) 02/13/2024 1446    BUN 11 02/13/2024 1446    CREATININE 1.2 02/13/2024 1446        Component Value Date/Time    CALCIUM 9.3 02/13/2024 1446    ALKPHOS 122 02/13/2024 1446    AST 14 02/13/2024 1446    ALT 11 02/13/2024 1446    BILITOT 0.4 02/13/2024 1446            Lab Results   Component Value Date    TSH 2.770 08/29/2023       Lab Results   Component Value Date    WBC 9.8 02/13/2024    HGB 9.8 (L)

## 2024-09-12 DIAGNOSIS — I10 ESSENTIAL HYPERTENSION: ICD-10-CM

## 2024-09-12 DIAGNOSIS — E78.2 MIXED HYPERLIPIDEMIA: ICD-10-CM

## 2024-09-12 DIAGNOSIS — E11.65 UNCONTROLLED TYPE 2 DIABETES MELLITUS WITH HYPERGLYCEMIA (HCC): ICD-10-CM

## 2024-09-12 RX ORDER — PIOGLITAZONEHYDROCHLORIDE 45 MG/1
TABLET ORAL
Qty: 90 TABLET | Refills: 3 | Status: SHIPPED | OUTPATIENT
Start: 2024-09-12

## 2024-09-12 RX ORDER — LOSARTAN POTASSIUM 50 MG/1
TABLET ORAL
Qty: 90 TABLET | Refills: 3 | Status: SHIPPED | OUTPATIENT
Start: 2024-09-12

## 2024-09-12 RX ORDER — ATORVASTATIN CALCIUM 80 MG/1
80 TABLET, FILM COATED ORAL DAILY
Qty: 90 TABLET | Refills: 3 | Status: SHIPPED | OUTPATIENT
Start: 2024-09-12

## 2024-10-07 DIAGNOSIS — E11.65 UNCONTROLLED TYPE 2 DIABETES MELLITUS WITH HYPERGLYCEMIA (HCC): ICD-10-CM

## 2024-10-07 NOTE — TELEPHONE ENCOUNTER
Pt called office requesting a refill of the Insulin pen needle 31g x 8mm to [facility] in [facility]. If no call back he will check with them around noon. Pt just used his last needle.

## 2024-12-02 ENCOUNTER — COMMUNITY OUTREACH (OUTPATIENT)
Dept: FAMILY MEDICINE CLINIC | Age: 69
End: 2024-12-02

## 2024-12-05 DIAGNOSIS — E11.65 UNCONTROLLED TYPE 2 DIABETES MELLITUS WITH HYPERGLYCEMIA (HCC): ICD-10-CM

## 2024-12-05 RX ORDER — INSULIN ASPART 100 [IU]/ML
INJECTION, SOLUTION INTRAVENOUS; SUBCUTANEOUS
Qty: 15 ML | Refills: 3 | Status: SHIPPED | OUTPATIENT
Start: 2024-12-05

## 2024-12-05 RX ORDER — INSULIN GLARGINE 100 [IU]/ML
INJECTION, SOLUTION SUBCUTANEOUS
Qty: 15 ML | Refills: 1 | Status: SHIPPED | OUTPATIENT
Start: 2024-12-05

## 2024-12-05 NOTE — TELEPHONE ENCOUNTER
----- Message from Sylwia STEPHENSON sent at 12/5/2024  2:30 PM EST -----  Regarding: ECC Message to Provider  ECC Message to Provider    Relationship to Patient: Self     Additional Information: patient has an appointment at [facility] on 1/7/2025 at noon for eye exam to keep tract of his cataract. His right eye is worse than his left eye. Patient also needs to have a refill on his lantus and novolog.   --------------------------------------------------------------------------------------------------------------------------    Call Back Information: OK to leave message on voicemail  Preferred Call Back Number: Phone [phone]

## 2024-12-09 DIAGNOSIS — E11.65 UNCONTROLLED TYPE 2 DIABETES MELLITUS WITH HYPERGLYCEMIA (HCC): ICD-10-CM

## 2024-12-09 RX ORDER — INSULIN ASPART 100 [IU]/ML
INJECTION, SOLUTION INTRAVENOUS; SUBCUTANEOUS
Qty: 15 ML | Refills: 3 | Status: SHIPPED | OUTPATIENT
Start: 2024-12-09 | End: 2024-12-11 | Stop reason: SDUPTHER

## 2024-12-09 RX ORDER — INSULIN GLARGINE 100 [IU]/ML
INJECTION, SOLUTION SUBCUTANEOUS
Qty: 15 ML | Refills: 1 | Status: SHIPPED | OUTPATIENT
Start: 2024-12-09 | End: 2024-12-11 | Stop reason: SDUPTHER

## 2024-12-09 NOTE — TELEPHONE ENCOUNTER
----- Message from Ligia S sent at 12/9/2024 10:04 AM EST -----  Regarding: ECC Message to Provider  ECC Message to Provider    Relationship to Patient: Self     Additional Information Patient wants to inform his pcp that his medication novolog and lantus were running pout of refill.  --------------------------------------------------------------------------------------------------------------------------    Call Back Information: OK to leave message on voicemail  Preferred Call Back Number: Phone [phone] (home)

## 2024-12-11 ENCOUNTER — OFFICE VISIT (OUTPATIENT)
Dept: FAMILY MEDICINE CLINIC | Age: 69
End: 2024-12-11

## 2024-12-11 VITALS
WEIGHT: 282.6 LBS | RESPIRATION RATE: 16 BRPM | BODY MASS INDEX: 37.28 KG/M2 | SYSTOLIC BLOOD PRESSURE: 124 MMHG | DIASTOLIC BLOOD PRESSURE: 84 MMHG | HEART RATE: 120 BPM

## 2024-12-11 DIAGNOSIS — Z23 NEED FOR INFLUENZA VACCINATION: ICD-10-CM

## 2024-12-11 DIAGNOSIS — E78.2 MIXED HYPERLIPIDEMIA: ICD-10-CM

## 2024-12-11 DIAGNOSIS — Z12.5 SCREENING PSA (PROSTATE SPECIFIC ANTIGEN): ICD-10-CM

## 2024-12-11 DIAGNOSIS — E11.65 UNCONTROLLED TYPE 2 DIABETES MELLITUS WITH HYPERGLYCEMIA (HCC): Primary | ICD-10-CM

## 2024-12-11 DIAGNOSIS — Z86.73 HISTORY OF CVA (CEREBROVASCULAR ACCIDENT): ICD-10-CM

## 2024-12-11 DIAGNOSIS — E66.01 SEVERE OBESITY (BMI 35.0-39.9) WITH COMORBIDITY: ICD-10-CM

## 2024-12-11 DIAGNOSIS — I10 ESSENTIAL HYPERTENSION: ICD-10-CM

## 2024-12-11 RX ORDER — INSULIN ASPART 100 [IU]/ML
INJECTION, SOLUTION INTRAVENOUS; SUBCUTANEOUS
Qty: 15 ML | Refills: 3 | Status: SHIPPED | OUTPATIENT
Start: 2024-12-11

## 2024-12-11 RX ORDER — ACYCLOVIR 400 MG/1
1 TABLET ORAL 4 TIMES DAILY
Qty: 1 EACH | Refills: 0 | Status: SHIPPED | OUTPATIENT
Start: 2024-12-11

## 2024-12-11 RX ORDER — INSULIN GLARGINE 100 [IU]/ML
INJECTION, SOLUTION SUBCUTANEOUS
Qty: 15 ML | Refills: 1 | Status: SHIPPED | OUTPATIENT
Start: 2024-12-11

## 2024-12-11 RX ORDER — ACYCLOVIR 400 MG/1
1 TABLET ORAL 4 TIMES DAILY
Qty: 1 EACH | Refills: 11 | Status: SHIPPED | OUTPATIENT
Start: 2024-12-11

## 2024-12-11 ASSESSMENT — ENCOUNTER SYMPTOMS
NAUSEA: 0
COUGH: 0
SHORTNESS OF BREATH: 0
ABDOMINAL PAIN: 0

## 2024-12-11 NOTE — PROGRESS NOTES
Richard Soares is a 69 y.o. male evaluated on 12/11/2024 for:    Chief Complaint   Patient presents with    Follow-up     Pt would like to discuss upcoming cataract surgery    Discuss Medications     Pt would like to discuss Jardiance use, he saw on TV that people are having trouble using this medication    Medication Refill       Patient Active Problem List   Diagnosis    Controlled type 2 diabetes mellitus without complication, without long-term current use of insulin (HCC)    Essential hypertension    History of CVA (cerebrovascular accident)    Mixed hyperlipidemia    Obesity (BMI 30-39.9)    Acute UTI    Hypoglycemia    Unspecified abdominal pain    Dizziness and giddiness       COLONOSCOPY - 2024    POD - Dr. Feldman    Overall doing well.  Denies CP, SOB or chest tightness.  No smoking hx.     BP wnl.  Losartan 50 mg.     BP Readings from Last 3 Encounters:   12/11/24 124/84   08/12/24 (!) 143/83   06/12/24 (!) 140/80       On Metformin 1000 mg BID, Jardiance 25 mg,  Actos 45 mg and  On Lantus 30 units BID and Novolog 13 u TID.     On Atorvastatin 80 mg.     Due for labs - did not complete prior to appointment.     Lab Results   Component Value Date    LABA1C 9.4 (H) 12/07/2023    LABA1C 9.0 (H) 08/29/2023    LABA1C 8.3 08/24/2023     Lab Results   Component Value Date     (H) 12/07/2023       No components found for: \"CHLPL\"  Lab Results   Component Value Date    TRIG 76 04/12/2022    TRIG 142 05/27/2021    TRIG 139 04/17/2020     Lab Results   Component Value Date    HDL 43 04/12/2022    HDL 42 05/27/2021    HDL 46 04/17/2020     No components found for: \"LDLCALC\"  No components found for: \"LABVLDL\"      Chemistry        Component Value Date/Time     02/13/2024 1446    K 4.4 02/13/2024 1446    K 3.9 01/15/2024 2038     02/13/2024 1446    CO2 20 (L) 02/13/2024 1446    BUN 11 02/13/2024 1446    CREATININE 1.2 02/13/2024 1446        Component Value Date/Time    CALCIUM 9.3 02/13/2024

## 2024-12-11 NOTE — PROGRESS NOTES
After obtaining consent, and per orders of Uriel Solis CNP, injection of Fluad 0.5ml given in Left deltoid by Libertad Acosta CMA ([facility]). Patient instructed to report any adverse reaction to me immediately.    Immunizations Administered       Name Date Dose Route    Influenza, FLUAD, (age 65 y+), IM, Trivalent PF, 0.5mL 12/11/2024 0.5 mL Intramuscular    Site: Deltoid- Left    Lot: [id number]    NDC: 34218-139-71

## 2025-04-15 ENCOUNTER — TELEPHONE (OUTPATIENT)
Dept: FAMILY MEDICINE CLINIC | Age: 70
End: 2025-04-15

## 2025-04-15 NOTE — TELEPHONE ENCOUNTER
Spoke with patient regarding labs ordered on 12/11/24.  Patient plans on having them done in the next few weeks.  Encouraged to have done ASAP.  Patient voices understanding

## 2025-04-28 ENCOUNTER — HOSPITAL ENCOUNTER (OUTPATIENT)
Age: 70
Discharge: HOME | End: 2025-04-28
Payer: COMMERCIAL

## 2025-04-28 ENCOUNTER — RESULTS FOLLOW-UP (OUTPATIENT)
Dept: FAMILY MEDICINE CLINIC | Age: 70
End: 2025-04-28

## 2025-04-28 DIAGNOSIS — E11.65 UNCONTROLLED TYPE 2 DIABETES MELLITUS WITH HYPERGLYCEMIA (HCC): ICD-10-CM

## 2025-04-28 DIAGNOSIS — E78.2 MIXED HYPERLIPIDEMIA: ICD-10-CM

## 2025-04-28 DIAGNOSIS — Z12.5 SCREENING PSA (PROSTATE SPECIFIC ANTIGEN): ICD-10-CM

## 2025-04-28 LAB
ALBUMIN SERPL BCG-MCNC: 3.9 G/DL (ref 3.4–4.9)
ALP SERPL-CCNC: 121 U/L (ref 40–129)
ALT SERPL W/O P-5'-P-CCNC: 19 U/L (ref 10–50)
ANION GAP SERPL CALC-SCNC: 10 MEQ/L (ref 8–16)
AST SERPL-CCNC: 26 U/L (ref 10–50)
BASOPHILS ABSOLUTE: 0.1 THOU/MM3 (ref 0–0.1)
BASOPHILS NFR BLD AUTO: 1.4 %
BILIRUB SERPL-MCNC: 0.7 MG/DL (ref 0.3–1.2)
BUN SERPL-MCNC: 5 MG/DL (ref 8–23)
CALCIUM SERPL-MCNC: 9.5 MG/DL (ref 8.8–10.2)
CHLORIDE SERPL-SCNC: 102 MEQ/L (ref 98–111)
CHOLEST SERPL-MCNC: 151 MG/DL (ref 100–199)
CO2 SERPL-SCNC: 27 MEQ/L (ref 22–29)
CREAT SERPL-MCNC: 1 MG/DL (ref 0.7–1.2)
CREAT UR-MCNC: 224 MG/DL
DEPRECATED MEAN GLUCOSE BLD GHB EST-ACNC: 135 MG/DL (ref 70–126)
DEPRECATED RDW RBC AUTO: 50.9 FL (ref 35–45)
EOSINOPHIL NFR BLD AUTO: 1.1 %
EOSINOPHILS ABSOLUTE: 0.1 THOU/MM3 (ref 0–0.4)
ERYTHROCYTE [DISTWIDTH] IN BLOOD BY AUTOMATED COUNT: 16.1 % (ref 11.5–14.5)
GFR SERPL CREATININE-BSD FRML MDRD: 81 ML/MIN/1.73M2
GLUCOSE SERPL-MCNC: 123 MG/DL (ref 74–109)
HBA1C MFR BLD HPLC: 6.5 % (ref 4–6)
HCT VFR BLD AUTO: 48.4 % (ref 42–52)
HDLC SERPL-MCNC: 48 MG/DL
HGB BLD-MCNC: 15.5 GM/DL (ref 14–18)
IMM GRANULOCYTES # BLD AUTO: 0.01 THOU/MM3 (ref 0–0.07)
IMM GRANULOCYTES NFR BLD AUTO: 0.2 %
LDLC SERPL CALC-MCNC: 80 MG/DL
LYMPHOCYTES ABSOLUTE: 1.2 THOU/MM3 (ref 1–4.8)
LYMPHOCYTES NFR BLD AUTO: 18.4 %
MCH RBC QN AUTO: 27.8 PG (ref 26–33)
MCHC RBC AUTO-ENTMCNC: 32 GM/DL (ref 32.2–35.5)
MCV RBC AUTO: 86.9 FL (ref 80–94)
MICROALBUMIN UR-MCNC: < 2 MG/DL
MICROALBUMIN/CREAT RATIO PNL UR: 9 MG/G (ref 0–30)
MONOCYTES ABSOLUTE: 0.6 THOU/MM3 (ref 0.4–1.3)
MONOCYTES NFR BLD AUTO: 9.2 %
NEUTROPHILS ABSOLUTE: 4.5 THOU/MM3 (ref 1.8–7.7)
NEUTROPHILS NFR BLD AUTO: 69.7 %
NRBC BLD AUTO-RTO: 0 /100 WBC
PLATELET # BLD AUTO: 336 THOU/MM3 (ref 130–400)
PMV BLD AUTO: 9.5 FL (ref 9.4–12.4)
POTASSIUM SERPL-SCNC: 4.1 MEQ/L (ref 3.5–5.2)
PROT SERPL-MCNC: 7.2 G/DL (ref 6.4–8.3)
PSA SERPL-MCNC: 0.68 NG/ML (ref 0–1)
RBC # BLD AUTO: 5.57 MILL/MM3 (ref 4.7–6.1)
SODIUM SERPL-SCNC: 139 MEQ/L (ref 135–145)
TRIGL SERPL-MCNC: 116 MG/DL (ref 0–199)
TSH SERPL DL<=0.05 MIU/L-ACNC: 2.8 UIU/ML (ref 0.27–4.2)
WBC # BLD AUTO: 6.5 THOU/MM3 (ref 4.8–10.8)

## 2025-04-28 PROCEDURE — 84443 ASSAY THYROID STIM HORMONE: CPT

## 2025-04-28 PROCEDURE — 36415 COLL VENOUS BLD VENIPUNCTURE: CPT

## 2025-04-28 PROCEDURE — 82043 UR ALBUMIN QUANTITATIVE: CPT

## 2025-04-28 PROCEDURE — 84153 ASSAY OF PSA TOTAL: CPT

## 2025-04-28 PROCEDURE — G0103 PSA SCREENING: HCPCS

## 2025-04-28 PROCEDURE — 83036 HEMOGLOBIN GLYCOSYLATED A1C: CPT

## 2025-04-28 PROCEDURE — 80053 COMPREHEN METABOLIC PANEL: CPT

## 2025-04-28 PROCEDURE — 85025 COMPLETE CBC W/AUTO DIFF WBC: CPT

## 2025-04-28 PROCEDURE — 80061 LIPID PANEL: CPT

## 2025-04-29 RX ORDER — ACYCLOVIR 400 MG/1
1 TABLET ORAL 4 TIMES DAILY
Qty: 1 EACH | Refills: 0 | Status: SHIPPED | OUTPATIENT
Start: 2025-04-29

## 2025-04-29 RX ORDER — INSULIN GLARGINE 100 [IU]/ML
INJECTION, SOLUTION SUBCUTANEOUS
Qty: 15 ML | Refills: 1 | Status: SHIPPED | OUTPATIENT
Start: 2025-04-29

## 2025-04-29 NOTE — TELEPHONE ENCOUNTER
Called patient and informed he verbalized understanding.   He stated that he is about out of his insulin.     He will need a prescription for Lantus sent to [facility] pharmacy.     He also mentioned that he has a Dexcom G7 sensors but he accidentally stepped on the reader. He stated that he would like to get a new reader sent to the pharmacy.